# Patient Record
Sex: FEMALE | Race: WHITE | NOT HISPANIC OR LATINO | Employment: UNEMPLOYED | URBAN - METROPOLITAN AREA
[De-identification: names, ages, dates, MRNs, and addresses within clinical notes are randomized per-mention and may not be internally consistent; named-entity substitution may affect disease eponyms.]

---

## 2017-06-02 ENCOUNTER — AMBULATORY - HEALTHEAST (OUTPATIENT)
Dept: MULTI SPECIALTY CLINIC | Facility: CLINIC | Age: 52
End: 2017-06-02

## 2017-06-02 LAB
HPV ABSTRACT: NORMAL
HPV_EXT - HISTORICAL: NORMAL
PAP SMEAR - HIM PATIENT REPORTED: NORMAL
PAP-ABSTRACT: NORMAL

## 2019-03-21 ENCOUNTER — TRANSFERRED RECORDS (OUTPATIENT)
Dept: HEALTH INFORMATION MANAGEMENT | Facility: CLINIC | Age: 54
End: 2019-03-21

## 2019-03-21 LAB
C TRACH DNA SPEC QL PROBE+SIG AMP: NEGATIVE
N GONORRHOEA DNA SPEC QL PROBE+SIG AMP: NEGATIVE
SPECIMEN DESCRIP: NORMAL
SPECIMEN DESCRIPTION: NORMAL

## 2019-04-11 ENCOUNTER — TELEPHONE (OUTPATIENT)
Dept: GASTROENTEROLOGY | Facility: CLINIC | Age: 54
End: 2019-04-11

## 2019-04-11 NOTE — TELEPHONE ENCOUNTER
[facility] Intake form:    Referring/Requesting provider and Health care System: Ludivina Solano NP from [facility] contact - Name, Lilia Lepe Phone [phone] and Fax number: N/A    Requested provider (if specified): any    Has patient been evaluated in clinic or had a procedure [facility] provider in the last 5 years: yes     Indication/Diagnosis for consultation: cyst on pancreas    Is diagnosis on list of approved diagnosis: yes    Has patient been evaluated by another Gastroenterologist? Yes,      Imaging completed:     CT scan     yes   MRI         no      Procedures:     Upper Endoscopy/EGD    no    Endoscopic Ultrasound/EUS no    ERCP      no    Colonoscopy    yes      Are images able/being pushed to our system? yes    Is patient aware of request for clinc consultation and ok to be contacted to schedule? yes    Inform referring clinic of the following and provided fax number to: [phone] - emailed

## 2019-04-12 NOTE — TELEPHONE ENCOUNTER
"Advanced Endoscopy     Referring provider: Ludivina Morales NP from [facility]       Referred to: [facility]     Provider Requested: NA     Referral Received: 4/12/19     Records received: In [facility]     Images received: reqeusted 4/12/19    Evaluation for: pancreatic cyst and abdominal pain     Clinical History (per RN review):     Hx Gerd, chronic constipation which has improved with medication changes and has been following with [facility]. Also worked up for right hepatic lobe lesions seen on CT.  Now has \"excrutiating\" lower abd pain before BM 2-3 x per week, requesting further work up. Work up started with CT showing 1cm cyst. No colonoscopy on record.    GI work up started at [facility]. Pt now with new insurance as of 4/1/19 and cannot follow at  any more, has selected [facility] for further care.       IMAGING    CT Abd/Pelvis- 3/28/19 for Abd Pain- report in [facility]  CONCLUSION:   1.  Large amount of stool in the right hemicolon.    2.  Stable 1 cm cyst at the junction of the body and tail of the pancreas. Consider Gastroenterology consult. See guidelines below.    3.  Stable small cavernous hemangioma and focal nodular hyperplasia in the liver.      MRI 3/6/18 - report in [facility]  CONCLUSION:  1.  Vague hypodensity in the right hepatic lobe inferiorly suggested on   outside  CT represents a benign cavernous hemangioma. Adjacent atypical hemangioma,   or  perhaps focal nodular hyperplasia in the right lobe anterolaterally.    2.  Small cortical cyst right mid kidney.    3.  Above study performed and interpreted in consultation with Dr. Kelby Sainz.    MD review date: 4/16/19  MD Decision for clinic consultation/Orders: Will recommend pre-op clearance, baseline MRI with IV Gadolinium and EUS in Unit J under MAC anesthesia. This is a routine procedure            Referral updates/Patient contacted:     "

## 2019-04-16 ENCOUNTER — TELEPHONE (OUTPATIENT)
Dept: GASTROENTEROLOGY | Facility: CLINIC | Age: 54
End: 2019-04-16

## 2019-04-16 ENCOUNTER — CARE COORDINATION (OUTPATIENT)
Dept: GASTROENTEROLOGY | Facility: CLINIC | Age: 54
End: 2019-04-16

## 2019-04-16 DIAGNOSIS — K86.89 PANCREATIC MASS: Primary | ICD-10-CM

## 2019-04-16 NOTE — PROGRESS NOTES
Advised of results of referral review.      Patient called and is amenable to plan: EUS with Dr. Holm and aware of the following:  Procedure explained to patient.  This is a non-urgent procedure.     Ordered MRI, gave patient phone number to schedule.     Can expect a call for date and time for procedure.   Will need a , someone to stay with them for 24 hours and stay in town for 24 hours (within 45 min of Hospital) post procedure, rational explained.   Will get pre-op physical done locally and will fax a copy to us along with bringing a hard copy with them. Fax number given. [phone]    Blood thinner -  no  ASA - no  Diabetic - no  NSAIDS: no    A pre-op nurse will call 1-2 days prior to the procedure.   Is advised to be NPO/solid food at midnight before the procedure in the event the procedure time is moved up. Ok to drink clear liquids (Water, Apple Juice or Gatorade) up to 2 hours prior to procedure.     Post Procedure: start with clear liquids and then advance as tolerated.     Verbalized understanding of all instructions. All questions answered.     Pallavi Walker, RN Care Coordinator

## 2019-04-17 ENCOUNTER — HOSPITAL ENCOUNTER (OUTPATIENT)
Facility: CLINIC | Age: 54
End: 2019-04-17
Attending: INTERNAL MEDICINE | Admitting: INTERNAL MEDICINE
Payer: COMMERCIAL

## 2019-04-18 ENCOUNTER — OFFICE VISIT - HEALTHEAST (OUTPATIENT)
Dept: FAMILY MEDICINE | Facility: CLINIC | Age: 54
End: 2019-04-18

## 2019-04-18 DIAGNOSIS — Z01.818 ENCOUNTER FOR PREOPERATIVE EXAMINATION FOR GENERAL SURGICAL PROCEDURE: ICD-10-CM

## 2019-04-18 DIAGNOSIS — K59.09 CHRONIC CONSTIPATION: ICD-10-CM

## 2019-04-18 DIAGNOSIS — F32.1 CURRENT MODERATE EPISODE OF MAJOR DEPRESSIVE DISORDER WITHOUT PRIOR EPISODE (H): ICD-10-CM

## 2019-04-18 DIAGNOSIS — D75.838 REACTIVE THROMBOCYTOSIS: ICD-10-CM

## 2019-04-18 DIAGNOSIS — K86.2 PANCREAS CYST: ICD-10-CM

## 2019-04-18 DIAGNOSIS — Z78.0 POST-MENOPAUSAL: ICD-10-CM

## 2019-04-18 LAB
ANION GAP SERPL CALCULATED.3IONS-SCNC: 13 MMOL/L (ref 5–18)
BUN SERPL-MCNC: 17 MG/DL (ref 8–22)
CALCIUM SERPL-MCNC: 10.3 MG/DL (ref 8.5–10.5)
CHLORIDE BLD-SCNC: 102 MMOL/L (ref 98–107)
CO2 SERPL-SCNC: 25 MMOL/L (ref 22–31)
CREAT SERPL-MCNC: 0.76 MG/DL (ref 0.6–1.1)
ERYTHROCYTE [DISTWIDTH] IN BLOOD BY AUTOMATED COUNT: 11.7 % (ref 11–14.5)
GFR SERPL CREATININE-BSD FRML MDRD: >60 ML/MIN/1.73M2
GLUCOSE BLD-MCNC: 80 MG/DL (ref 70–125)
HCT VFR BLD AUTO: 42.6 % (ref 35–47)
HGB BLD-MCNC: 14 G/DL (ref 12–16)
MCH RBC QN AUTO: 29.3 PG (ref 27–34)
MCHC RBC AUTO-ENTMCNC: 32.9 G/DL (ref 32–36)
MCV RBC AUTO: 89 FL (ref 80–100)
PLATELET # BLD AUTO: 475 THOU/UL (ref 140–440)
PMV BLD AUTO: 7.4 FL (ref 7–10)
POTASSIUM BLD-SCNC: 5.6 MMOL/L (ref 3.5–5)
RBC # BLD AUTO: 4.77 MILL/UL (ref 3.8–5.4)
SODIUM SERPL-SCNC: 140 MMOL/L (ref 136–145)
WBC: 9.8 THOU/UL (ref 4–11)

## 2019-04-18 ASSESSMENT — MIFFLIN-ST. JEOR: SCORE: 1094.84

## 2019-04-19 ENCOUNTER — COMMUNICATION - HEALTHEAST (OUTPATIENT)
Dept: FAMILY MEDICINE | Facility: CLINIC | Age: 54
End: 2019-04-19

## 2019-04-19 LAB
BASOPHILS # BLD AUTO: 0.2 THOU/UL (ref 0–0.2)
BASOPHILS NFR BLD AUTO: 2 % (ref 0–2)
EOSINOPHIL COUNT (ABSOLUTE): 0.4 THOU/UL (ref 0–0.4)
EOSINOPHIL NFR BLD AUTO: 4 % (ref 0–6)
ERYTHROCYTE [DISTWIDTH] IN BLOOD BY AUTOMATED COUNT: 12.2 % (ref 11–14.5)
HCT VFR BLD AUTO: 43.3 % (ref 35–47)
HGB BLD-MCNC: 14 G/DL (ref 12–16)
IRON SATN MFR SERPL: 15 % (ref 20–50)
IRON SERPL-MCNC: 57 UG/DL (ref 42–175)
LYMPHOCYTES # BLD AUTO: 3.1 THOU/UL (ref 0.8–4.4)
LYMPHOCYTES NFR BLD AUTO: 30 % (ref 20–40)
MCH RBC QN AUTO: 28.9 PG (ref 27–34)
MCHC RBC AUTO-ENTMCNC: 32.3 G/DL (ref 32–36)
MCV RBC AUTO: 90 FL (ref 80–100)
MONOCYTES # BLD AUTO: 0.7 THOU/UL (ref 0–0.9)
MONOCYTES NFR BLD AUTO: 7 % (ref 2–10)
PATH REPORT.MICROSCOPIC SPEC OTHER STN: ABNORMAL
PLAT MORPH BLD: ABNORMAL
PLATELET # BLD AUTO: 457 THOU/UL (ref 140–440)
PMV BLD AUTO: 9.1 FL (ref 8.5–12.5)
RBC # BLD AUTO: 4.84 MILL/UL (ref 3.8–5.4)
REACTIVE LYMPHS: ABNORMAL
TIBC SERPL-MCNC: 387 UG/DL (ref 313–563)
TOTAL NEUTROPHILS-ABS(DIFF): 6 THOU/UL (ref 2–7.7)
TOTAL NEUTROPHILS-REL(DIFF): 58 % (ref 50–70)
TRANSFERRIN SERPL-MCNC: 309 MG/DL (ref 212–360)
WBC: 10.3 THOU/UL (ref 4–11)

## 2019-04-22 ENCOUNTER — COMMUNICATION - HEALTHEAST (OUTPATIENT)
Dept: FAMILY MEDICINE | Facility: CLINIC | Age: 54
End: 2019-04-22

## 2019-04-22 DIAGNOSIS — M25.50 MULTIPLE JOINT PAIN: ICD-10-CM

## 2019-04-22 DIAGNOSIS — R79.89 LOW SERUM PROLACTIN: ICD-10-CM

## 2019-04-22 DIAGNOSIS — R53.82 CHRONIC FATIGUE: ICD-10-CM

## 2019-04-22 DIAGNOSIS — R52 GENERALIZED BODY ACHES: ICD-10-CM

## 2019-04-22 LAB
LAB AP CHARGES (HE HISTORICAL CONVERSION): NORMAL
PATH REPORT.COMMENTS IMP SPEC: NORMAL
PATH REPORT.COMMENTS IMP SPEC: NORMAL
PATH REPORT.FINAL DX SPEC: NORMAL
PATH REPORT.RELEVANT HX SPEC: NORMAL

## 2019-04-23 ENCOUNTER — DOCUMENTATION ONLY (OUTPATIENT)
Dept: CARE COORDINATION | Facility: CLINIC | Age: 54
End: 2019-04-23

## 2019-04-24 ENCOUNTER — COMMUNICATION - HEALTHEAST (OUTPATIENT)
Dept: FAMILY MEDICINE | Facility: CLINIC | Age: 54
End: 2019-04-24

## 2019-04-25 ENCOUNTER — COMMUNICATION - HEALTHEAST (OUTPATIENT)
Dept: FAMILY MEDICINE | Facility: CLINIC | Age: 54
End: 2019-04-25

## 2019-04-30 ENCOUNTER — AMBULATORY - HEALTHEAST (OUTPATIENT)
Dept: LAB | Facility: CLINIC | Age: 54
End: 2019-04-30

## 2019-04-30 DIAGNOSIS — D75.838 REACTIVE THROMBOCYTOSIS: ICD-10-CM

## 2019-04-30 LAB
BASOPHILS # BLD AUTO: 0.1 THOU/UL (ref 0–0.2)
BASOPHILS NFR BLD AUTO: 1 % (ref 0–2)
EOSINOPHIL # BLD AUTO: 0.2 THOU/UL (ref 0–0.4)
EOSINOPHIL NFR BLD AUTO: 3 % (ref 0–6)
ERYTHROCYTE [DISTWIDTH] IN BLOOD BY AUTOMATED COUNT: 12 % (ref 11–14.5)
FERRITIN SERPL-MCNC: 25 NG/ML (ref 10–130)
HCT VFR BLD AUTO: 40.6 % (ref 35–47)
HGB BLD-MCNC: 13.5 G/DL (ref 12–16)
LYMPHOCYTES # BLD AUTO: 2.8 THOU/UL (ref 0.8–4.4)
LYMPHOCYTES NFR BLD AUTO: 41 % (ref 20–40)
MCH RBC QN AUTO: 29.1 PG (ref 27–34)
MCHC RBC AUTO-ENTMCNC: 33.3 G/DL (ref 32–36)
MCV RBC AUTO: 87 FL (ref 80–100)
MONOCYTES # BLD AUTO: 0.5 THOU/UL (ref 0–0.9)
MONOCYTES NFR BLD AUTO: 7 % (ref 2–10)
NEUTROPHILS # BLD AUTO: 3.3 THOU/UL (ref 2–7.7)
NEUTROPHILS NFR BLD AUTO: 49 % (ref 50–70)
PLATELET # BLD AUTO: 354 THOU/UL (ref 140–440)
PMV BLD AUTO: 7.3 FL (ref 7–10)
POTASSIUM BLD-SCNC: 4.6 MMOL/L (ref 3.5–5)
RBC # BLD AUTO: 4.65 MILL/UL (ref 3.8–5.4)
WBC: 6.9 THOU/UL (ref 4–11)

## 2019-05-07 ENCOUNTER — PATIENT OUTREACH (OUTPATIENT)
Dept: GASTROENTEROLOGY | Facility: CLINIC | Age: 54
End: 2019-05-07

## 2019-05-07 ENCOUNTER — HOSPITAL ENCOUNTER (OUTPATIENT)
Facility: CLINIC | Age: 54
End: 2019-05-07
Attending: INTERNAL MEDICINE | Admitting: INTERNAL MEDICINE
Payer: COMMERCIAL

## 2019-05-07 ENCOUNTER — TELEPHONE (OUTPATIENT)
Dept: GASTROENTEROLOGY | Facility: CLINIC | Age: 54
End: 2019-05-07

## 2019-05-07 NOTE — PROGRESS NOTES
Hien called with worries and concerns regarding her EUS scheduled on 6/25. She's still having abdominal pain that can't be explained, is worried that she might have ovarian cancer and about the cysts listed on her kidney and liver. She's also worried that she might be without insurance after 5/31 due to a job change.    Reviewed findings from chart with her, particularly her last CT scan. Offered to move up EUS if possible and reached out to scheduling, however, her insurance requires a clinic visit before procedure and unable to get clinic prior to when it is  Scheduled on 6/13. Called patient back and she said she was going to GYN tomorrow and confirmed that we had copies of her recent bloodwork, which was confirmed via [facility]. Confirmed she wanted to keep clinic and EUS as scheduled, patient in agreement.    Pallavi Walker, EMMANUEL Care Coordinator

## 2019-05-07 NOTE — TELEPHONE ENCOUNTER
Patient returned phone call regarding rescheduling EUS.  Patient states she had a conversation with EMMANUEL Olivo letting her know that insurance would not cover EUS until she is seen in clinic.  Patient states she can't get into clinic until June.  Patient states she would like to cancel EUS at this time until she can be seen in the clinic.  Confirmed with patient.  Message sent to Dr. Cardona and EMMANUEL Olivo informing them of this.

## 2019-05-08 ENCOUNTER — OFFICE VISIT (OUTPATIENT)
Dept: OBGYN | Facility: CLINIC | Age: 54
End: 2019-05-08
Payer: COMMERCIAL

## 2019-05-08 VITALS
BODY MASS INDEX: 21.44 KG/M2 | HEART RATE: 79 BPM | WEIGHT: 121.6 LBS | OXYGEN SATURATION: 98 % | SYSTOLIC BLOOD PRESSURE: 125 MMHG | DIASTOLIC BLOOD PRESSURE: 81 MMHG

## 2019-05-08 DIAGNOSIS — R10.31 RLQ ABDOMINAL PAIN: Primary | ICD-10-CM

## 2019-05-08 DIAGNOSIS — K59.09 CHRONIC CONSTIPATION: ICD-10-CM

## 2019-05-08 PROCEDURE — 99203 OFFICE O/P NEW LOW 30 MIN: CPT | Performed by: OBSTETRICS & GYNECOLOGY

## 2019-05-08 RX ORDER — POLYETHYLENE GLYCOL 3350 17 G/17G
1 POWDER, FOR SOLUTION ORAL DAILY
COMMUNITY

## 2019-05-08 RX ORDER — TRAZODONE HYDROCHLORIDE 50 MG/1
75 TABLET, FILM COATED ORAL AT BEDTIME
COMMUNITY

## 2019-05-08 RX ORDER — GABAPENTIN 100 MG/1
CAPSULE ORAL
Refills: 2 | COMMUNITY
Start: 2019-04-25

## 2019-05-08 RX ORDER — TRETINOIN 0.25 MG/G
CREAM TOPICAL AT BEDTIME
COMMUNITY

## 2019-05-10 NOTE — PROGRESS NOTES
"Hien is a 54 year old female .  She presents today to see me for RLQ pain.  She states she has had this \"for a while.\"  Later she acknowledges she has had it for years.  She isn't able to describe the pain to me.  She went through Menopause at age 50.  She has a history of constipation.  She has used Miralax, drinking lots of water.  She has also seen PT due to her chronic joint pain.  She notes that she had a low prolactin level on testing that was performed when she saw the Sexual Health MD at [facility].   She does not know of any other aggravating or alleviating factors.    She had a CT scan performed at [facility] and the results are reviewed.   CT Abd Pelvis W IV Cont3/  Counts include 234 beds at the [facility]  Result Narrative   EXAM: CT ABD PELVIS W IV CONT  LOCATION: [facility]  DATE/TIME: 3/28/2019 2:48 PM    INDICATION: Generalized abdominal pain.  COMPARISON: MRI abdomen 3/5/2018 and noncontrast CT abdomen and pelvis 2018.  TECHNIQUE: Helical enhanced thin-section CT scan of the abdomen and pelvis was performed following injection of IV contrast. Multiplanar reformats were obtained. Dose reduction techniques were used.   CONTRAST: Iohexol 350 mg/mL IV SOLN 100 mL.    FINDINGS:   LUNG BASES: Negative.    ABDOMEN: Stable 1.5 cm diffusely enhancing mass in segment 4B of the liver, series 2 image 27, corresponding to focal nodular hyperplasia on prior MRI. Stable 1.5 cm cavernous hemangioma in segment 5-6 of the liver inferiorly, series 2 image 40. Gallbladder, spleen, both adrenal glands, left kidney, and stomach are normal. Stable 1.0 x 0.5 cm cyst at the junction of the body and tail of the pancreas, series 2 image 19. The pancreas is otherwise unremarkable. Stable small right renal cyst. No lymphadenopathy.    PELVIS: Normal caliber abdominal aorta and IVC. Both ureters, bladder, uterus, both adnexa, and the appendix are normal. Large amount of stool in the right hemicolon. The bowel is " otherwise unremarkable. No free fluid or lymphadenopathy.    MUSCULOSKELETAL: Negative.    CONCLUSION:   1.  Large amount of stool in the right hemicolon.    2.  Stable 1 cm cyst at the junction of the body and tail of the pancreas. Consider Gastroenterology consult. See guidelines below.    3.  Stable small cavernous hemangioma and focal nodular hyperplasia in the liver.    REFERENCE:  International Consensus Guidelines for Management of IPMN and MCN of the Pancreas. Pancreatology 12 (2012) 183-197.    Asymptomatic patient without high-risk stigmata of malignancy (obstructive jaundice with cystic lesion in head of pancreas, enhancing solid component within cyst, or main pancreatic duct greater than 10 mm), and without worrisome features (cyst greater than 3 cm, thickened/enhancing cyst walls, main pancreatic duct 5-9 mm, mural nodule, or abrupt change in caliber of pancreatic duct with distal pancreatic atrophy).    Size of largest cyst:  Less than 1 cm: CT or MRI with contrast in 2-3 years.    1-2 cm: CT or MRI with contrast yearly for 2 years, then lengthen interval if no change.    2-3 cm: EUS in 3-6 months, then can alternate MRI with EUS as appropriate.    Greater than 3 cm: Close surveillance alternating MRI with EUS every 3-6 months. Consider surgery in young, fit patients.    Consider Gastroenterology consultation for all categories of pancreatic cysts.   Other Result Information   Interface, In Rad Results - 03/28/2019  4:54 PM CDT  EXAM: CT ABD PELVIS W IV CONT  LOCATION:  [facility]  DATE/TIME: 3/28/2019 2:48 PM    INDICATION: Generalized abdominal pain.  COMPARISON: MRI abdomen 3/5/2018 and noncontrast CT abdomen and pelvis 1/17/2018.  TECHNIQUE: Helical enhanced thin-section CT scan of the abdomen and pelvis was performed following injection of IV contrast. Multiplanar reformats were obtained. Dose reduction techniques were used.   CONTRAST: Iohexol 350 mg/mL IV SOLN 100 mL.    FINDINGS:   LUNG  BASES: Negative.    ABDOMEN: Stable 1.5 cm diffusely enhancing mass in segment 4B of the liver, series 2 image 27, corresponding to focal nodular hyperplasia on prior MRI. Stable 1.5 cm cavernous hemangioma in segment 5-6 of the liver inferiorly, series 2 image 40. Gallbladder, spleen, both adrenal glands, left kidney, and stomach are normal. Stable 1.0 x 0.5 cm cyst at the junction of the body and tail of the pancreas, series 2 image 19. The pancreas is otherwise unremarkable. Stable small right renal cyst. No lymphadenopathy.    PELVIS: Normal caliber abdominal aorta and IVC. Both ureters, bladder, uterus, both adnexa, and the appendix are normal. Large amount of stool in the right hemicolon. The bowel is otherwise unremarkable. No free fluid or lymphadenopathy.    MUSCULOSKELETAL: Negative.    CONCLUSION:   1.  Large amount of stool in the right hemicolon.    2.  Stable 1 cm cyst at the junction of the body and tail of the pancreas. Consider Gastroenterology consult. See guidelines below.    3.  Stable small cavernous hemangioma and focal nodular hyperplasia in the liver.    REFERENCE:  International Consensus Guidelines for Management of IPMN and MCN of the Pancreas. Pancreatology 12 (2012) 183-197.    Asymptomatic patient without high-risk stigmata of malignancy (obstructive jaundice with cystic lesion in head of pancreas, enhancing solid component within cyst, or main pancreatic duct greater than 10 mm), and without worrisome features (cyst greater than 3 cm, thickened/enhancing cyst walls, main pancreatic duct 5-9 mm, mural nodule, or abrupt change in caliber of pancreatic duct with distal pancreatic atrophy).    Size of largest cyst:  Less than 1 cm: CT or MRI with contrast in 2-3 years.    1-2 cm: CT or MRI with contrast yearly for 2 years, then lengthen interval if no change.    2-3 cm: EUS in 3-6 months, then can alternate MRI with EUS as appropriate.    Greater than 3 cm: Close surveillance alternating  MRI with EUS every 3-6 months. Consider surgery in young, fit patients.    Consider Gastroenterology consultation for all categories of pancreatic cysts.           Past Medical History:   Diagnosis Date     Acne      SANDRA positive      Anxiety      Arthralgia of multiple joints      Bilateral knee pain      Chest wall pain      Chronic constipation      Dry eye      Dysthymic disorder      Fibrocystic breast      Fibromyalgia      GERD (gastroesophageal reflux disease)      Hip pain      Hypermobility of joint      Insomnia      Moderate episode of recurrent major depressive disorder (H)      Primary osteoarthritis of both hands      Sensory hearing loss, bilateral      Somatic dysfunction of pelvic region      Ureteral calculus of left kidney transplant        Past Surgical History:   Procedure Laterality Date     LASIK BILATERAL         OB History    Para Term  AB Living   2 0 0 0 0 0   SAB TAB Ectopic Multiple Live Births   0 0 0 0 2      # Outcome Date GA Lbr Lukas/2nd Weight Sex Delivery Anes PTL Lv   2             1                 Gynecological History         No LMP recorded (lmp unknown). Patient is postmenopausal.     no STD/no PID/no IUD   no abnormal pap smear (last pap 17)      see above HPI        Allergies   Allergen Reactions     Lubiprostone Shortness Of Breath, Other (See Comments), Difficulty breathing and Unknown       Current Outpatient Medications   Medication Sig Dispense Refill     Ascorbic Acid (VITAMIN C PO) Take 1,000 mg by mouth daily       calcium citrate-vitamin D (CITRACAL) 315-200 MG-UNIT TABS Take 2 tablets by mouth daily       Calcium-Vitamin D-Vitamin K (CALCIUM SOFT CHEWS PO) Take 1 chew tab by mouth 2 times daily       Coenzyme Q10 (CO Q 10) 10 MG CAPS Take 100 mg by mouth daily       Cyanocobalamin (B-12) 2500 MCG TABS Take 2,500 mg by mouth daily       gabapentin (NEURONTIN) 100 MG capsule TAKE 1 CAPSULE BY MOUTH THREE TIMES A DAY AS  NEEDED FOR ANXIETY  2     GLUCOSAMINE SULFATE PO Take 1,500 mg by mouth daily       MAGNESIUM OXIDE PO Take 3 tablets by mouth daily       Omega-3 Fatty Acids (OMEGA-3 FISH OIL PO) Take 2 tablets by mouth daily       polyethylene glycol (MIRALAX/GLYCOLAX) packet Take 1 packet by mouth daily       Probiotic Product (PROBIOTIC DAILY PO) Take 5,000 mcg by mouth       traZODone (DESYREL) 50 MG tablet Take 75 mg by mouth At Bedtime       tretinoin (RETIN-A) 0.025 % external cream Apply topically At Bedtime       vitamin  B complex with vitamin C (VITAMIN  B COMPLEX) TABS Take 1 tablet by mouth daily       VITAMIN E MTC PO Take 400 Units by mouth       Zinc 50 MG CAPS Take 1 tablet by mouth         Social History     Socioeconomic History     Marital status:      Spouse name: Not on file     Number of children: 2     Years of education: Not on file     Highest education level: Not on file   Occupational History     Not on file   Social Needs     Financial resource strain: Not on file     Food insecurity:     Worry: Not on file     Inability: Not on file     Transportation needs:     Medical: Not on file     Non-medical: Not on file   Tobacco Use     Smoking status: Former Smoker     Last attempt to quit: 1995     Years since quittin.3   Substance and Sexual Activity     Alcohol use: Yes     Drug use: Not Currently     Sexual activity: Not on file   Lifestyle     Physical activity:     Days per week: Not on file     Minutes per session: Not on file     Stress: Not on file   Relationships     Social connections:     Talks on phone: Not on file     Gets together: Not on file     Attends [employer] service: Not on file     Active member of club or organization: Not on file     Attends meetings of clubs or organizations: Not on file     Relationship status: Not on file     Intimate partner violence:     Fear of current or ex partner: Not on file     Emotionally abused: Not on file     Physically abused: Not on  file     Forced sexual activity: Not on file   Other Topics Concern     Not on file   Social History Narrative     Not on file     Family History   Problem Relation Age of Onset     Hyperlipidemia Mother      Thyroid Disease Mother      Breast Cancer Mother      Heart Disease Father      Hyperlipidemia Father      Hypertension Father      GI problems Father      Hyperlipidemia Sister      Polycystic ovary syndrome Sister        Review of Systems:  10 point ROS of systems including Constitutional, Eyes, Respiratory, Cardiovascular, Gastroenterology, Genitourinary, Integumentary, Muscularskeletal, Psychiatric were all negative except for pertinent positives noted in my HPI and in the PMH.        EXAM:  /81 (BP Location: Right arm, Cuff Size: Adult Regular)   Pulse 79   Wt 55.2 kg (121 lb 9.6 oz)   LMP  (LMP Unknown)   SpO2 98%   Breastfeeding? No   BMI 21.44 kg/m    Body mass index is 21.44 kg/m .  General:  WNWD female, NAD  Alert  Oriented x 3  Gait:  Normal  Skin:  Normal skin turgor  HEENT:  NC/AT, EOMI  Neck:  No masses noted, symmetrical  Lungs:  Good respiratory effort   Abdomen:  Mild tenderness to the right of midline and umbilicus, non-distended.  Vulva: No external lesions, normal hair distribution, no adenopathy  BUS:  Normal, no masses noted  Urethra:  No hypermobility noted   Urethral meatus:  No masses or lesions seen.  Vagina: Moist, pink, no abnormal discharge, well rugated, no lesions  Cervix: Smooth, pink, no visible lesions  Uterus: Normal size, anteverted, non-tender, mobile  Ovaries: No mass, non-tender, mobile  Perianal: no masses or lesions seen.   Extremities:  No clubbing, cyanosis or edema.       ASSESSMENT:  RLQ pain  Chronic constipation       PLAN:  The patient and I discussed her extensive medical history, of which none of it was completed previously, so the medical, surgical and family histories are reviewed and completed.   Based on the exam, I don't suspect the pain is GYN  in origin.  I suspect it might be related with her bowel issues and chronic constipation.  She asked about the pancreatic cyst and if that might be related with her symptoms.  I don't suspect the pancreatic cyst is related to GYN issues.    She is to follow up with GI  TT 30 min  CT and review of records greater than 50%  Ifeanyi Linton MD

## 2019-05-13 ENCOUNTER — DOCUMENTATION ONLY (OUTPATIENT)
Dept: CARE COORDINATION | Facility: CLINIC | Age: 54
End: 2019-05-13

## 2019-05-23 ENCOUNTER — OFFICE VISIT (OUTPATIENT)
Dept: INTERNAL MEDICINE | Facility: CLINIC | Age: 54
End: 2019-05-23
Payer: COMMERCIAL

## 2019-05-23 VITALS
BODY MASS INDEX: 21.33 KG/M2 | HEART RATE: 69 BPM | OXYGEN SATURATION: 98 % | SYSTOLIC BLOOD PRESSURE: 121 MMHG | DIASTOLIC BLOOD PRESSURE: 83 MMHG | WEIGHT: 121 LBS

## 2019-05-23 DIAGNOSIS — F32.A DEPRESSION, UNSPECIFIED DEPRESSION TYPE: ICD-10-CM

## 2019-05-23 DIAGNOSIS — R53.83 OTHER FATIGUE: ICD-10-CM

## 2019-05-23 DIAGNOSIS — E78.2 MIXED HYPERLIPIDEMIA: ICD-10-CM

## 2019-05-23 DIAGNOSIS — R93.89 ABNORMAL CT SCAN: Primary | ICD-10-CM

## 2019-05-23 DIAGNOSIS — F41.9 ANXIETY: ICD-10-CM

## 2019-05-23 LAB — DEPRECATED CALCIDIOL+CALCIFEROL SERPL-MC: 65 UG/L (ref 20–75)

## 2019-05-23 RX ORDER — BUSPIRONE HYDROCHLORIDE 7.5 MG/1
7.5 TABLET ORAL 2 TIMES DAILY
Qty: 60 TABLET | Refills: 1 | Status: SHIPPED | OUTPATIENT
Start: 2019-05-23

## 2019-05-23 RX ORDER — CITALOPRAM HYDROBROMIDE 10 MG/1
10 TABLET ORAL DAILY
Qty: 60 TABLET | Refills: 1 | Status: SHIPPED | OUTPATIENT
Start: 2019-05-23

## 2019-05-23 ASSESSMENT — ENCOUNTER SYMPTOMS
DYSPNEA ON EXERTION: 0
HOARSE VOICE: 1
HALLUCINATIONS: 0
ABDOMINAL PAIN: 1
SNORES LOUDLY: 0
NAIL CHANGES: 1
DECREASED CONCENTRATION: 1
ORTHOPNEA: 0
WEAKNESS: 1
EYE PAIN: 1
DIZZINESS: 0
SINUS PAIN: 1
DECREASED LIBIDO: 1
LOSS OF CONSCIOUSNESS: 0
EYE REDNESS: 1
JOINT SWELLING: 0
BACK PAIN: 1
EXERCISE INTOLERANCE: 0
SPEECH CHANGE: 0
NECK PAIN: 0
LIGHT-HEADEDNESS: 0
INSOMNIA: 1
ARTHRALGIAS: 1
TROUBLE SWALLOWING: 1
COUGH DISTURBING SLEEP: 1
EYE WATERING: 1
CONSTIPATION: 1
TREMORS: 1
NECK MASS: 0
CHILLS: 1
BOWEL INCONTINENCE: 0
DECREASED APPETITE: 0
BLOATING: 1
HYPOTENSION: 0
HEARTBURN: 1
TASTE DISTURBANCE: 1
SINUS CONGESTION: 1
JAUNDICE: 0
SKIN CHANGES: 0
SEIZURES: 0
SYNCOPE: 0
DOUBLE VISION: 0
NIGHT SWEATS: 0
SORE THROAT: 1
LEG PAIN: 0
FEVER: 1
WEIGHT GAIN: 0
SPUTUM PRODUCTION: 1
HEMOPTYSIS: 0
MYALGIAS: 1
MUSCLE WEAKNESS: 1
INCREASED ENERGY: 1
HYPERTENSION: 1
SHORTNESS OF BREATH: 0
POLYDIPSIA: 0
SMELL DISTURBANCE: 1
DIARRHEA: 0
FATIGUE: 1
PALPITATIONS: 1
COUGH: 1
NERVOUS/ANXIOUS: 1
MEMORY LOSS: 1
POLYPHAGIA: 0
WEIGHT LOSS: 0
WHEEZING: 0
DEPRESSION: 1
EYE IRRITATION: 1
STIFFNESS: 1
POSTURAL DYSPNEA: 0
SLEEP DISTURBANCES DUE TO BREATHING: 0
PANIC: 0
HEADACHES: 1
NUMBNESS: 0
POOR WOUND HEALING: 0
BLOOD IN STOOL: 0
HOT FLASHES: 0
MUSCLE CRAMPS: 1
PARALYSIS: 0
NAUSEA: 1
ALTERED TEMPERATURE REGULATION: 0
DISTURBANCES IN COORDINATION: 0
VOMITING: 0
RECTAL PAIN: 0
TINGLING: 0

## 2019-05-23 ASSESSMENT — PAIN SCALES - GENERAL: PAINLEVEL: NO PAIN (0)

## 2019-05-23 ASSESSMENT — ANXIETY QUESTIONNAIRES
1. FEELING NERVOUS, ANXIOUS, OR ON EDGE: NEARLY EVERY DAY
GAD7 TOTAL SCORE: 17
5. BEING SO RESTLESS THAT IT IS HARD TO SIT STILL: NOT AT ALL
6. BECOMING EASILY ANNOYED OR IRRITABLE: NEARLY EVERY DAY
2. NOT BEING ABLE TO STOP OR CONTROL WORRYING: NEARLY EVERY DAY
3. WORRYING TOO MUCH ABOUT DIFFERENT THINGS: NEARLY EVERY DAY
IF YOU CHECKED OFF ANY PROBLEMS ON THIS QUESTIONNAIRE, HOW DIFFICULT HAVE THESE PROBLEMS MADE IT FOR YOU TO DO YOUR WORK, TAKE CARE OF THINGS AT HOME, OR GET ALONG WITH OTHER PEOPLE: EXTREMELY DIFFICULT
7. FEELING AFRAID AS IF SOMETHING AWFUL MIGHT HAPPEN: MORE THAN HALF THE DAYS

## 2019-05-23 ASSESSMENT — PATIENT HEALTH QUESTIONNAIRE - PHQ9
5. POOR APPETITE OR OVEREATING: NEARLY EVERY DAY
SUM OF ALL RESPONSES TO PHQ QUESTIONS 1-9: 22

## 2019-05-23 NOTE — PATIENT INSTRUCTIONS
[facility] Medication Refill Request Information:  * Please contact your pharmacy regarding ANY request for medication refills.  ** [facility] Prescription Fax = [phone]  * Please allow 3 business days for routine medication refills.  * Please allow 5 business days for controlled substance medication refills.     [facility] Test Result notification information:  *You will be notified with in 7-10 days of your appointment day regarding the results of your test.  If you are on MyChart you will be notified as soon as the provider has reviewed the results and signed off on them.    [facility]: [phone]

## 2019-05-23 NOTE — PROGRESS NOTES
[facility]  Primary Care Center   Ct Potts MD  05/23/2019      Chief Complaint:   Establish Care       History of Present Illness:   Hien Palafox is a 54 year old female who presents to establish care. The patient recently switched insurance companies so she had to switch primary care providers as well from [facility]. She is a nurse but it currently between jobs. We updated her medical history, surgical history, family history and social history.     Pancreatic cyst: When she was being seen at [facility] they found a pancreatic cyst on a CT. However, she was switching insurances so could not follow up with the clinic she was seeing before. She was referred to Dr. Holm by [facility] and they set up a EGD without a consult first. She had a preop done but had to reschedule the EGD so then had to redo the preop. The most recent preop she was ill with a URI and wanted to discuss those labs as well. She does not know if she wants to follow through with the EGD because it is very expensive and she is not super worried about the cysts. She has no abdominal pain.     URI: The patient has had symptoms of the URI in late March. She got over them but they are now back. She has a reproductive cough, sinus pain, ear ache and possible fevers. She says she has never been this sick before. She does not think it is allergies but is ultimately unsure. She felt well for three weeks after the first URI but then started feeling all the symptoms she felt before except for a sore throat. She feels the phlegm from the cough is coming from the lungs. She denies any vomiting or diarrhea. She has never had any lab abnormalities like this before, elevated platelets and potassium.     Psychiatry: She has had on and off depression for 30+ years now. She is not on any medication regularly right now but has a Gabapentin prescription saying it does not work for her. She has taken a lot of different  antidepressants over the years but did not remember which one worked for her. She also finds it hard to sleep and will be up late some nights. She has not had a consistent therapist in a long time because she has not found one she is comfortable with or one that is in her network. Last year she saw one therapist who she felt was judging her and shaming her. Her daughter also has mental health issues and she tried some medications that she is on but none worked for her.     Family issues: Since she is between jobs she is not working currently. Her daughter just graduated from college but does not have a job so is also not working. On top of that her son is about to graduate high school and is going to the [employer] but did not apply for scholarships so will have to pay for tuition in full. Nobody has a job right now so she is worried about money.     Abdominal pain: The patient has on and off LRQ abdominal pain. She has seen a physical therapist in the past and they attributed her pain to hyprerflexibility. Her muscles will get very tight and she needs to roll out after working out or else she has pain.     Libido: In the past, she has had a lot of problems with libido. Sex is painful for her and she is very dry. She tried testosterone pills in the past but stopped doing that because she does not have a partner. She also has tried vaginal estrogen cream and serotonin specific reuptake inhibitor. She stopped all of this in January. The patient would like something to help her with her mental health symptoms and libido if possible.     Cholesterol: She had labs taken about a year ago at her old clinic. Her LDL and total cholesterol were a little bit high and because of her mother's cholesterol issues she would like to get her cholesterol checked today and regularly in the future.     Other concerns discussed:  1. Nails keep peeling, tries to eat a healthy diet, takes Calcium supplement   2. Wanted to  discuss high blood pressure in the past but it was normal today        Review of Systems:   Pertinent items are noted in HPI and below, remainder of complete ROS is negative.    Answers for HPI/ROS submitted by the patient on 5/23/2019   General Symptoms: Yes  Skin Symptoms: Yes  HENT Symptoms: Yes  EYE SYMPTOMS: Yes  HEART SYMPTOMS: Yes  LUNG SYMPTOMS: Yes  INTESTINAL SYMPTOMS: Yes  URINARY SYMPTOMS: No  GYNECOLOGIC SYMPTOMS: Yes  BREAST SYMPTOMS: No  SKELETAL SYMPTOMS: Yes  BLOOD SYMPTOMS: No  NERVOUS SYSTEM SYMPTOMS: Yes  MENTAL HEALTH SYMPTOMS: Yes  Fever: Yes  Loss of appetite: No  Weight loss: No  Weight gain: No  Fatigue: Yes  Night sweats: No  Chills: Yes  Increased stress: Yes  Excessive hunger: No  Excessive thirst: No  Feeling hot or cold when others believe the temperature is normal: No  Loss of height: No  Post-operative complications: No  Surgical site pain: No  Hallucinations: No  Change in or Loss of Energy: Yes  Hyperactivity: No  Confusion: Yes  Changes in hair: Yes  Changes in moles/birth marks: No  Itching: No  Rashes: No  Changes in nails: Yes  Acne: Yes  Hair in places you don't want it: No  Change in facial hair: No  Warts: No  Non-healing sores: No  Scarring: No  Flaking of skin: No  Color changes of hands/feet in cold : No  Sun sensitivity: No  Skin thickening: No  Ear pain: Yes  Ear discharge: No  Hearing loss: No  Tinnitus: No  Nosebleeds: No  Congestion: Yes  Sinus pain: Yes  Trouble swallowing: Yes   Voice hoarseness: Yes  Mouth sores: No  Sore throat: Yes  Tooth pain: No  Gum tenderness: No  Bleeding gums: No  Change in taste: Yes  Change in sense of smell: Yes  Dry mouth: No  Hearing aid used: No  Neck lump: No  Eye pain: Yes  Vision loss: No  Dry eyes: Yes  Watery eyes: Yes  Eye bulging: No  Double vision: No  Flashing of lights: No  Spots: No  Floaters: No  Redness: Yes  Crossed eyes: No  Tunnel Vision: No  Yellowing of eyes: No  Eye irritation: Yes  Cough: Yes  Sputum or phlegm:  Yes  Coughing up blood: No  Difficulty breating or shortness of breath: No  Snoring: No  Wheezing: No  Difficulty breathing on exertion: No  Nighttime Cough: Yes  Difficulty breathing when lying flat: No  Chest pain or pressure: No  Fast or irregular heartbeat: Yes  Pain in legs with walking: No  Trouble breathing while lying down: No  Fingers or toes appear blue: No  High blood pressure: Yes  Low blood pressure: No  Fainting: No  Murmurs: No  Pacemaker: No  Varicose veins: No  Edema or swelling: No  Wake up at night with shortness of breath: No  Light-headedness: No  Exercise intolerance: No  Heart burn or indigestion: Yes  Nausea: Yes  Vomiting: No  Abdominal pain: Yes  Bloating: Yes  Constipation: Yes  Diarrhea: No  Blood in stool: No  Black stools: No  Rectal or Anal pain: No  Fecal incontinence: No  Yellowing of skin or eyes: No  Vomit with blood: No  Change in stools: No  Back pain: Yes  Muscle aches: Yes  Neck pain: No  Swollen joints: No  Joint pain: Yes  Bone pain: Yes  Muscle cramps: Yes  Muscle weakness: Yes  Joint stiffness: Yes  Bone fracture: No  Trouble with coordination: No  Dizziness or trouble with balance: No  Fainting or black-out spells: No  Memory loss: Yes  Headache: Yes  Seizures: No  Speech problems: No  Tingling: No  Tremor: Yes  Weakness: Yes  Difficulty walking: No  Paralysis: No  Numbness: No  Bleeding or spotting between periods: No  Heavy or painful periods: No  Irregular periods: No  Vaginal discharge: No  Hot flashes: No  Vaginal dryness: Yes  Genital ulcers: No  Reduced libido: Yes  Painful intercourse: Yes  Difficulty with sexual arousal: Yes  Post-menopausal bleeding: No  Nervous or Anxious: Yes  Depression: Yes  Trouble sleeping: Yes  Trouble thinking or concentrating: Yes  Mood changes: Yes  Panic attacks: No      Active Medications:     Current Outpatient Medications:      Ascorbic Acid (VITAMIN C PO), Take 1,000 mg by mouth daily, Disp: , Rfl:      busPIRone (BUSPAR) 7.5 MG  tablet, Take 1 tablet (7.5 mg) by mouth 2 times daily May increase to 15 mg twice daily, Disp: 60 tablet, Rfl: 1     calcium citrate-vitamin D (CITRACAL) 315-200 MG-UNIT TABS, Take 2 tablets by mouth daily, Disp: , Rfl:      citalopram (CELEXA) 10 MG tablet, Take 1 tablet (10 mg) by mouth daily, Disp: 60 tablet, Rfl: 1     Coenzyme Q10 (CO Q 10) 10 MG CAPS, Take 100 mg by mouth daily, Disp: , Rfl:      Cyanocobalamin (B-12) 2500 MCG TABS, Take 2,500 mg by mouth daily, Disp: , Rfl:      gabapentin (NEURONTIN) 100 MG capsule, TAKE 1 CAPSULE BY MOUTH THREE TIMES A DAY AS NEEDED FOR ANXIETY, Disp: , Rfl: 2     MAGNESIUM OXIDE PO, Take 3 tablets by mouth daily, Disp: , Rfl:      Omega-3 Fatty Acids (OMEGA-3 FISH OIL PO), Take 2 tablets by mouth daily, Disp: , Rfl:      polyethylene glycol (MIRALAX/GLYCOLAX) packet, Take 1 packet by mouth daily, Disp: , Rfl:      Probiotic Product (PROBIOTIC DAILY PO), Take 5,000 mcg by mouth, Disp: , Rfl:      ranitidine (ZANTAC) 150 MG tablet, Take 150 mg by mouth as needed for heartburn, Disp: , Rfl:      traZODone (DESYREL) 50 MG tablet, Take 75 mg by mouth At Bedtime, Disp: , Rfl:      tretinoin (RETIN-A) 0.025 % external cream, Apply topically At Bedtime, Disp: , Rfl:      VITAMIN E MTC PO, Take 400 Units by mouth, Disp: , Rfl:      Zinc 50 MG CAPS, Take 1 tablet by mouth, Disp: , Rfl:       Allergies:   Lubiprostone      Past Medical History:  Acne  SANDRA positive  Anxiety  Arthralgia of multiple joints  Chronic constipation  Dysthymic disorder  Fibrocystic breast  Fibromyalgia  Gastroesophageal reflux disease  Hypermobility of joint  Insomnia  Moderate episode of recurrent major depressive disorder  Primary osteoarthritis of both hands  Sensory hearing loss, bilateral  Somatic dysfunction of pelvic region   Ureteral calculus of left kidney transplant      Past Surgical History:  Lasik bilateral    Family History:   Hyperlipidemia - mother  Thyroid disease - mother  Breast cancer -  mother  Heart disease - father  Hyperlipidemia - father, sister  Hypertension - father  GI problems - father  Polycystic ovary syndrome - sister     Social History:   Tobacco Use: former smoker, quit 1995  Alcohol Use: yes   PCP: Margarita Gardner      Physical Exam:   /83 (BP Location: Left arm, Patient Position: Sitting, Cuff Size: Adult Regular)   Pulse 69   Wt 54.9 kg (121 lb)   LMP  (LMP Unknown)   SpO2 98%   Breastfeeding? No   BMI 21.33 kg/m     Constitutional: Alert, oriented, pleasant, no acute distress  Head: Normocephalic, atraumatic  Eyes: Extra-ocular movements intact, no scleral icterus  ENT: Oropharynx clear, moist mucus membranes, good dentition  Neck: Supple, no lymphadenopathy  Cardiovascular: Regular rate and rhythm, no murmurs, rubs or gallops, peripheral pulses full/symmetric  Respiratory: Good air movement bilaterally, lungs clear, no wheezes/rales/rhonchi  Musculoskeletal: No edema, normal muscle tone, normal gait  Neurologic: Alert and oriented, cranial nerves 2-12 intact.  Psychiatric: normal mentation, blunted affect and mood      Assessment and Plan:  Abnormal CT scan  Discussed that pancreatic cysts appear low risk. She has a GI appt but I suspect she may not need EUS.    Depression, unspecified depression type  - citalopram (CELEXA) 10 MG tablet  Dispense: 60 tablet; Refill: 1  - BEHAVIORAL / SPIRITUAL HEALTH ([facility] ONLY)    Anxiety  Reports increased anxiety right now d/t both kids at home.  - busPIRone (BUSPAR) 7.5 MG tablet  Dispense: 60 tablet; Refill: 1  - BEHAVIORAL / SPIRITUAL HEALTH ([facility] ONLY)    Mixed hyperlipidemia  - Lipid panel reflex to direct LDL Fasting    Other fatigue  - Vitamin D Deficiency     Routine Health Maintenance  Immunizations (zoster, pneumovax, flu, Tdap, Hep A/B):   There is no immunization history on file for this patient.  Lipids: ordered  Colonoscopy (50-75 yrs):  6/16  Impression:          - Non-bleeding internal  hemorrhoids.                       - The examination was otherwise normal.                       - No specimens collected.  Recommendation:      - Repeat colonoscopy in 5 years for screening                        purposes, with an extended prep.    Mammogram (40-75 yrs): discussed, up to date, repeat annually   Pap (21-65 yrs): 6/17 pap  HIV/HCV if risk factors:  Safety/Lifestyle: discussed  Tob/EtOH: reviewed   Depression: discussed  Advanced Directive: not discussed      Follow-up: Return in about 6 weeks (around 7/4/2019) for Routine Visit.         Scribe Disclosure:  I, Юлия Hagen, am serving as a scribe to document services personally performed by Ct Potts MD at this visit, based upon the provider's statements to me. All documentation has been reviewed by the aforementioned provider prior to being entered into the official medical record.     Portions of this medical record were completed by a scribe. UPON MY REVIEW AND AUTHENTICATION BY ELECTRONIC SIGNATURE, this confirms (a) I performed the applicable clinical services, and (b) the record is accurate.   Ct Potts MD  Internal Medicine    45 min spent face to face, of which >50% time spent on counseling/coordinating care exclusive of any procedure time

## 2019-05-23 NOTE — NURSING NOTE
Chief Complaint   Patient presents with     Establish Care     Patient states that she is here to establish care with dr elías Lara, EMT at 9:57 AM on 5/23/2019.

## 2019-05-23 NOTE — PROGRESS NOTES
Mammo    6/16  Impression:          - Non-bleeding internal hemorrhoids.                       - The examination was otherwise normal.                       - No specimens collected.  Recommendation:      - Repeat colonoscopy in 5 years for screening                        purposes, with an extended prep.    6/17 pap

## 2019-05-23 NOTE — PROGRESS NOTES
[facility]  Primary Care Center   Ct Potts MD  05/23/2019      Chief Complaint:   Establish Care       History of Present Illness:   Hien Palafox is a 54 year old female who presents to establish care. The patient recently switched insurance companies so she had to switch primary care providers as well from [facility]. She is a nurse but it currently between jobs. We updated her medical history, surgical history, family history and social history.     Pancreatic cyst: When she was being seen at [facility] they found a pancreatic cyst on a CT. However, she was switching insurances so could not follow up with the clinic she was seeing before. She was referred to Dr. Holm by [facility] and they set up a EGD without a consult first. She had a preop done but had to reschedule the EGD so then had to redo the preop. The most recent preop she was ill with a URI and wanted to discuss those labs as well. She does not know if she wants to follow through with the EGD because it is very expensive and she is not super worried about the cysts. She has no abdominal pain.     URI: The patient has had symptoms of the URI in late March. She got over them but they are now back. She has a reproductive cough, sinus pain, ear ache and possible fevers. She says she has never been this sick before. She does not think it is allergies but is ultimately unsure. She felt well for three weeks after the first URI but then started feeling all the symptoms she felt before except for a sore throat. She feels the phlegm from the cough is coming from the lungs. She denies any vomiting or diarrhea. She has never had any lab abnormalities like this before, elevated platelets and potassium.     Psychiatry: She has had on and off depression for 30+ years now. She is not on any medication regularly right now but has a Gabapentin prescription saying it does not work for her. She has taken a lot of different  antidepressants over the years but did not remember which one worked for her. She also finds it hard to sleep and will be up late some nights. She has not had a consistent therapist in a long time because she has not found one she is comfortable with or one that is in her network. Last year she saw one therapist who she felt was judging her and shaming her. Her daughter also has mental health issues and she tried some medications that she is on but none worked for her.     Family issues: Since she is between jobs she is not working currently. Her daughter just graduated from college but does not have a job so is also not working. On top of that her son is about to graduate high school and is going to the [employer] but did not apply for scholarships so will have to pay for tuition in full. Nobody has a job right now so she is worried about money.     Abdominal pain: The patient has on and off LRQ abdominal pain. She has seen a physical therapist in the past and they attributed her pain to hyprerflexibility. Her muscles will get very tight and she needs to roll out after working out or else she has pain.     Libido: In the past, she has had a lot of problems with libido. Sex is painful for her and she is very dry. She tried testosterone pills in the past but stopped doing that because she does not have a partner. She also has tried vaginal estrogen cream and serotonin specific reuptake inhibitor. She stopped all of this in January. The patient would like something to help her with her mental health symptoms and libido if possible.     Cholesterol: She had labs taken about a year ago at her old clinic. Her LDL and total cholesterol were a little bit high and because of her mother's cholesterol issues she would like to get her cholesterol checked today and regularly in the future.     Other concerns discussed:  1. Nails keep peeling, tries to eat a healthy diet, takes Calcium supplement   2. Wanted to  discuss high blood pressure in the past but it was normal today        Review of Systems:   Pertinent items are noted in HPI and below, remainder of complete ROS is negative.    Answers for HPI/ROS submitted by the patient on 5/23/2019   General Symptoms: Yes  Skin Symptoms: Yes  HENT Symptoms: Yes  EYE SYMPTOMS: Yes  HEART SYMPTOMS: Yes  LUNG SYMPTOMS: Yes  INTESTINAL SYMPTOMS: Yes  URINARY SYMPTOMS: No  GYNECOLOGIC SYMPTOMS: Yes  BREAST SYMPTOMS: No  SKELETAL SYMPTOMS: Yes  BLOOD SYMPTOMS: No  NERVOUS SYSTEM SYMPTOMS: Yes  MENTAL HEALTH SYMPTOMS: Yes  Fever: Yes  Loss of appetite: No  Weight loss: No  Weight gain: No  Fatigue: Yes  Night sweats: No  Chills: Yes  Increased stress: Yes  Excessive hunger: No  Excessive thirst: No  Feeling hot or cold when others believe the temperature is normal: No  Loss of height: No  Post-operative complications: No  Surgical site pain: No  Hallucinations: No  Change in or Loss of Energy: Yes  Hyperactivity: No  Confusion: Yes  Changes in hair: Yes  Changes in moles/birth marks: No  Itching: No  Rashes: No  Changes in nails: Yes  Acne: Yes  Hair in places you don't want it: No  Change in facial hair: No  Warts: No  Non-healing sores: No  Scarring: No  Flaking of skin: No  Color changes of hands/feet in cold : No  Sun sensitivity: No  Skin thickening: No  Ear pain: Yes  Ear discharge: No  Hearing loss: No  Tinnitus: No  Nosebleeds: No  Congestion: Yes  Sinus pain: Yes  Trouble swallowing: Yes   Voice hoarseness: Yes  Mouth sores: No  Sore throat: Yes  Tooth pain: No  Gum tenderness: No  Bleeding gums: No  Change in taste: Yes  Change in sense of smell: Yes  Dry mouth: No  Hearing aid used: No  Neck lump: No  Eye pain: Yes  Vision loss: No  Dry eyes: Yes  Watery eyes: Yes  Eye bulging: No  Double vision: No  Flashing of lights: No  Spots: No  Floaters: No  Redness: Yes  Crossed eyes: No  Tunnel Vision: No  Yellowing of eyes: No  Eye irritation: Yes  Cough: Yes  Sputum or phlegm:  Yes  Coughing up blood: No  Difficulty breating or shortness of breath: No  Snoring: No  Wheezing: No  Difficulty breathing on exertion: No  Nighttime Cough: Yes  Difficulty breathing when lying flat: No  Chest pain or pressure: No  Fast or irregular heartbeat: Yes  Pain in legs with walking: No  Trouble breathing while lying down: No  Fingers or toes appear blue: No  High blood pressure: Yes  Low blood pressure: No  Fainting: No  Murmurs: No  Pacemaker: No  Varicose veins: No  Edema or swelling: No  Wake up at night with shortness of breath: No  Light-headedness: No  Exercise intolerance: No  Heart burn or indigestion: Yes  Nausea: Yes  Vomiting: No  Abdominal pain: Yes  Bloating: Yes  Constipation: Yes  Diarrhea: No  Blood in stool: No  Black stools: No  Rectal or Anal pain: No  Fecal incontinence: No  Yellowing of skin or eyes: No  Vomit with blood: No  Change in stools: No  Back pain: Yes  Muscle aches: Yes  Neck pain: No  Swollen joints: No  Joint pain: Yes  Bone pain: Yes  Muscle cramps: Yes  Muscle weakness: Yes  Joint stiffness: Yes  Bone fracture: No  Trouble with coordination: No  Dizziness or trouble with balance: No  Fainting or black-out spells: No  Memory loss: Yes  Headache: Yes  Seizures: No  Speech problems: No  Tingling: No  Tremor: Yes  Weakness: Yes  Difficulty walking: No  Paralysis: No  Numbness: No  Bleeding or spotting between periods: No  Heavy or painful periods: No  Irregular periods: No  Vaginal discharge: No  Hot flashes: No  Vaginal dryness: Yes  Genital ulcers: No  Reduced libido: Yes  Painful intercourse: Yes  Difficulty with sexual arousal: Yes  Post-menopausal bleeding: No  Nervous or Anxious: Yes  Depression: Yes  Trouble sleeping: Yes  Trouble thinking or concentrating: Yes  Mood changes: Yes  Panic attacks: No      Active Medications:     Current Outpatient Medications:      Ascorbic Acid (VITAMIN C PO), Take 1,000 mg by mouth daily, Disp: , Rfl:      calcium citrate-vitamin D  (CITRACAL) 315-200 MG-UNIT TABS, Take 2 tablets by mouth daily, Disp: , Rfl:      Coenzyme Q10 (CO Q 10) 10 MG CAPS, Take 100 mg by mouth daily, Disp: , Rfl:      Cyanocobalamin (B-12) 2500 MCG TABS, Take 2,500 mg by mouth daily, Disp: , Rfl:      gabapentin (NEURONTIN) 100 MG capsule, TAKE 1 CAPSULE BY MOUTH THREE TIMES A DAY AS NEEDED FOR ANXIETY, Disp: , Rfl: 2     MAGNESIUM OXIDE PO, Take 3 tablets by mouth daily, Disp: , Rfl:      Omega-3 Fatty Acids (OMEGA-3 FISH OIL PO), Take 2 tablets by mouth daily, Disp: , Rfl:      polyethylene glycol (MIRALAX/GLYCOLAX) packet, Take 1 packet by mouth daily, Disp: , Rfl:      Probiotic Product (PROBIOTIC DAILY PO), Take 5,000 mcg by mouth, Disp: , Rfl:      ranitidine (ZANTAC) 150 MG tablet, Take 150 mg by mouth as needed for heartburn, Disp: , Rfl:      traZODone (DESYREL) 50 MG tablet, Take 75 mg by mouth At Bedtime, Disp: , Rfl:      tretinoin (RETIN-A) 0.025 % external cream, Apply topically At Bedtime, Disp: , Rfl:      VITAMIN E MTC PO, Take 400 Units by mouth, Disp: , Rfl:      Zinc 50 MG CAPS, Take 1 tablet by mouth, Disp: , Rfl:       Allergies:   Lubiprostone      Past Medical History:  Acne  SANDRA positive  Anxiety  Arthralgia of multiple joints  Chronic constipation  Dysthymic disorder  Fibrocystic breast  Fibromyalgia  Gastroesophageal reflux disease  Hypermobility of joint  Insomnia  Moderate episode of recurrent major depressive disorder  Primary osteoarthritis of both hands  Sensory hearing loss, bilateral  Somatic dysfunction of pelvic region   Ureteral calculus of left kidney transplant      Past Surgical History:  Lasik bilateral    Family History:   Hyperlipidemia - mother  Thyroid disease - mother  Breast cancer - mother  Heart disease - father  Hyperlipidemia - father, sister  Hypertension - father  GI problems - father  Polycystic ovary syndrome - sister     Social History:   Tobacco Use: former smoker, quit 1995  Alcohol Use: yes   PCP: Margarita WESLEY  [place]      Physical Exam:   /83 (BP Location: Left arm, Patient Position: Sitting, Cuff Size: Adult Regular)   Pulse 69   Wt 54.9 kg (121 lb)   LMP  (LMP Unknown)   SpO2 98%   Breastfeeding? No   BMI 21.33 kg/m     Constitutional: Alert, oriented, pleasant, no acute distress  Head: Normocephalic, atraumatic  Eyes: Extra-ocular movements intact, no scleral icterus  ENT: Oropharynx clear, moist mucus membranes, good dentition  Neck: Supple, no lymphadenopathy, no thyromegaly   Cardiovascular: Regular rate and rhythm, no murmurs, rubs or gallops, peripheral pulses full/symmetric  Respiratory: Good air movement bilaterally, lungs clear, no wheezes/rales/rhonchi  GI: Abdomen soft, bowel sounds present, nondistended, nontender, no organomegaly or masses, no rebound/guarding  Musculoskeletal: No edema, normal muscle tone, normal gait  Neurologic: Alert and oriented, cranial nerves 2-12 intact.  Skin: No rashes/lesions  Psychiatric: normal mentation, affect and mood      Assessment and Plan:  Abnormal CT scan  ***    Depression, unspecified depression type  ***    Anxiety  ***     Routine Health Maintenance  Immunizations (zoster, pneumovax, flu, Tdap, Hep A/B):   There is no immunization history on file for this patient.  Lipids: No results for input(s): CHOL, HDL, LDL, TRIG, CHOLHDLRATIO in the last 20417 hours.  PSA (50-75 yrs): No results found for: PSA   AAA Screening (65-75 yrs):  Lung Ca Screening (>30 pk age 55-79 or >20 py age 50-79 + RF):  Colonoscopy (50-75 yrs):  Dexa (>65W or 70M yrs):  Mammogram (40-75 yrs): discussed, up to date, repeat annually   Pap (21-65 yrs): discussed, up to date   Pelvic/Breast:  GC/Chlam (<25 yrs):  HIV/HCV if risk factors:  Safety/Lifestyle: discussed  Tob/EtOH:  Depression:  Advanced Directive:      Follow-up: Return in about 6 weeks (around 7/4/2019) for Routine Visit.         Scribe Disclosure:  Юлия NG, am serving as a scribe to document services personally  performed by Ct Potts MD at this visit, based upon the provider's statements to me. All documentation has been reviewed by the aforementioned provider prior to being entered into the official medical record.     Portions of this medical record were completed by a scribe. UPON MY REVIEW AND AUTHENTICATION BY ELECTRONIC SIGNATURE, this confirms (a) I performed the applicable clinical services, and (b) the record is accurate.

## 2019-05-24 ASSESSMENT — ANXIETY QUESTIONNAIRES: GAD7 TOTAL SCORE: 17

## 2019-05-28 ENCOUNTER — MYC MEDICAL ADVICE (OUTPATIENT)
Dept: INTERNAL MEDICINE | Facility: CLINIC | Age: 54
End: 2019-05-28

## 2019-05-28 DIAGNOSIS — J40 BRONCHITIS: Primary | ICD-10-CM

## 2019-05-28 NOTE — TELEPHONE ENCOUNTER
[facility] Call Center    Phone Message    May a detailed message be left on voicemail: yes, Pt is wanting a call back in regards to medication and also her lab results regarding her Lipids. Pt states she had seen only her Vitamin D test has came back and not the Lipid.     Reason for Call: Medication Question or concern regarding medication   Prescription Clarification  Name of Medication: Anti-biotics  Prescribing Provider: Ct Potts   Pharmacy: [facility] [id number] [place] [address]   What on the order needs clarification? Pt states she is needing anti-biotics due to she is still currently sick. Pt states Doctor had stated she will prescribe medication if Pt is still sick this week. Pt states she is still sick and is wanting the anti-biotics.           Action Taken: Message routed to:  [facility] jazlyn

## 2019-05-29 NOTE — TELEPHONE ENCOUNTER
[facility]    Phone Message    May a detailed message be left on voicemail: yes    Reason for Call: Other: Per call from PT no one has gotten back to her about the antibiotic RX. PT is requesting for a call back if Rx can't get an approval.      Action Taken: Message routed to:  [facility] ([facility]): Primary Care

## 2019-05-30 RX ORDER — AZITHROMYCIN 250 MG/1
250 TABLET, FILM COATED ORAL DAILY
Qty: 6 TABLET | Refills: 0 | Status: SHIPPED | OUTPATIENT
Start: 2019-05-30

## 2019-05-30 NOTE — TELEPHONE ENCOUNTER
Please call patient to let her know azithro sent to her pharmacy. If not feeling better next week would recommend reevaluation.  Thanks,  Ct Potts MD  Internal Medicine

## 2019-05-31 ENCOUNTER — MYC MEDICAL ADVICE (OUTPATIENT)
Dept: BEHAVIORAL HEALTH | Facility: CLINIC | Age: 54
End: 2019-05-31

## 2019-06-11 NOTE — TELEPHONE ENCOUNTER
I spoke with client about Dr. Potts referral to see Bryan Dennis or Dr. Juanito Naranjo for treatment for anxiety and depression. She stated she does not like coming to the [facility]. Writer gave client the number to [facility] and also instructed her how to find providers in her network either by calling them or visiting their web site.

## 2019-11-03 ENCOUNTER — HEALTH MAINTENANCE LETTER (OUTPATIENT)
Age: 54
End: 2019-11-03

## 2020-02-27 ENCOUNTER — PRE VISIT (OUTPATIENT)
Dept: NEUROLOGY | Facility: CLINIC | Age: 55
End: 2020-02-27

## 2020-02-27 NOTE — TELEPHONE ENCOUNTER
FUTURE VISIT INFORMATION      FUTURE VISIT INFORMATION:    Date: 3/17/2020    Time: 8AM     Location: [facility]  REFERRAL INFORMATION:    Referring provider:  Self     Referring providers clinic:      Reason for visit/diagnosis  Numbness, tingling, twitching in legs     RECORDS REQUESTED FROM:       Clinic name Comments Records Status Imaging Status   [facility] 2/26/2020 Care Everywhere N/A

## 2020-03-13 ENCOUNTER — TELEPHONE (OUTPATIENT)
Dept: NEUROLOGY | Facility: CLINIC | Age: 55
End: 2020-03-13

## 2020-03-13 NOTE — TELEPHONE ENCOUNTER
VM left for the patient notifying her that the upcoming appointment scheduled for 3/17/20 will be cancelled.    Per [facility] guidelines, some of the appointment will be cancelled due to the COVID19. Phone number left for the patient to call back and reschedule at her convenience.

## 2020-11-16 ENCOUNTER — HEALTH MAINTENANCE LETTER (OUTPATIENT)
Age: 55
End: 2020-11-16

## 2021-02-07 ENCOUNTER — HEALTH MAINTENANCE LETTER (OUTPATIENT)
Age: 56
End: 2021-02-07

## 2021-05-29 ENCOUNTER — HEALTH MAINTENANCE LETTER (OUTPATIENT)
Age: 56
End: 2021-05-29

## 2021-07-20 VITALS — WEIGHT: 117 LBS | HEIGHT: 63 IN | BODY MASS INDEX: 20.73 KG/M2

## 2021-07-20 NOTE — ADDENDUM NOTE
Addendum Note by Ignacio Yi RT (R) at 4/18/2019 11:00 AM     Author: Ignacio Yi RT (R) Service: -- Author Type: Radiologic Technologist    Filed: 4/19/2019  9:01 AM Encounter Date: 4/18/2019 Status: Signed    : Ignacio Yi RT (R) (Radiologic Technologist)    Addended by: IGNACIO YI on: 4/19/2019 09:01 AM        Modules accepted: Orders

## 2021-07-20 NOTE — ADDENDUM NOTE
Addendum Note by Martha Gutierrez CNP at 4/18/2019 11:00 AM     Author: Martha Gutierrez CNP Service: -- Author Type: Nurse Practitioner    Filed: 4/19/2019  2:39 PM Encounter Date: 4/18/2019 Status: Signed    : Martha Gutierrez CNP (Nurse Practitioner)    Addended by: MARTHA GUTIERREZ on: 4/19/2019 02:39 PM        Modules accepted: Orders

## 2021-07-20 NOTE — TELEPHONE ENCOUNTER
Called patient regarding recent blood work. VM left to call back or check MyChart. She has elevated plts as well as K+. Unclear etiology of either of these. Further blood work does not reveal iron def. I would like patient to return for blood work in 2 weeks to repeat. Orders are placed.

## 2021-07-20 NOTE — ADDENDUM NOTE
Addendum Note by Ignacio Yi RT (R) at 4/18/2019 11:00 AM     Author: Ignacio Yi RT (R) Service: -- Author Type: Radiologic Technologist    Filed: 4/19/2019  8:39 AM Encounter Date: 4/18/2019 Status: Signed    : Ignacio Yi RT (R) (Radiologic Technologist)    Addended by: IGNACIO YI on: 4/19/2019 08:39 AM        Modules accepted: Orders

## 2021-07-20 NOTE — PROGRESS NOTES
Preoperative Exam    Scheduled Procedure: Endoscopy  Surgery Date:  [id number]  Surgery Location: [facility] fax [phone]    Surgeon:  Dr. Jorge    Assessment/Plan:     1. Encounter for preoperative examination for general surgical procedure  Patient seen today for preoperative examination for an endoscopy secondary to pancreas cyst that is warranting further evaluation.  She has a significant history of joint pain, depression, arthritis, and chronic constipation.  She is postmenopausal.  Based on my evaluation today there is no clinical indication to decline patient from having surgery/procedure to be completed.  EKG was not completed today as a procedure is low risk and patient does not have a cardiac history.  Blood work shows thrombocytosis. Pt does currently have a URI and this could explain the result. No history of anemia, blood loss, or post splenectomy.  Recommend proceeding with procedure without further clinical clarification. I will have patient follow up with repeat blood work for further evaluation of the abnormalities.  - Basic Metabolic Panel  - HM2(CBC w/o Differential)    2. Pancreas cyst  Pancrease cyst found on CT scan further evaluation warranted.    3. Chronic constipation  History of chronic constipation has done extensive home remedies/treatment for this.  Has had a colonoscopy.  Recommend further evaluation and management through gastroenterology.    4. Current moderate episode of major depressive disorder without prior episode (H)  History of depression and anxiety.  Placed referral to psychiatry and psychology for patient.  Recommend restarting on medication.  PHQ-9 score today is 16.  She has been on antidepressants in the past.  - Ambulatory referral to Psychiatry  - Ambulatory referral to Psychology    5. Post-menopausal  Postmenopausal state.  She is symptomatic with this and has been taking Vagifem vaginal suppository.  She has had blood work completed that she has showed low  prolactin level and this is concerning for her.  She is wondering who she should see for ongoing treatment and management of this.  I recommended that she see an OB/GYN for further evaluation of this.  She should not need a referral for this but if she does I would place one for her.    6. Reactive thrombocytosis  Patient appears to have a reactive thrombocytosis with a pseudohyperkalemia.  I would like to reevaluate this in 2 weeks with a repeat CBC and differential as well as a repeat potassium.  I will also have patient have an iron and transferrin binding capacity added on to previous blood work if able as well as a differential added onto the blood work collected.  Patient will be notified of these results.  - Iron and Transferrin Iron Binding Capacity  - HM1(CBC and Differential); Future  - HM1(CBC and Differential); Future  - Potassium; Future    Surgical Procedure Risk: Low (reported cardiac risk generally < 1%)  Have you had prior anesthesia?: Yes  Have you or any family members had a previous anesthesia reaction:  No  Do you or any family members have a history of a clotting or bleeding disorder?: No  Cardiac Risk Assessment: no increased risk for major cardiac complications    Patient approved for surgery with general or local anesthesia.    Functional Status: Independent  Patient plans to recover at home with family.     Subjective:      Hien Palafox is a 54 y.o. female who presents for a preoperative consultation.  She was found to have a cyst on her pancreas and it has been recommended that she have an endoscopy to evaluate further. SH will also have an MRI completed to assess as well. She does have a history of chronic constipation despite symptomatic at home management with water, fiber, exercise, and miralax. She has chronic abdominal discomfort. She has not any unexplained weight loss. Has concerns of her hormones. She has been on hormone therapy in the past and has been prescribed estradiol  vaginal 2x per week. She will take propanolol for anxiety as needed. She has long history of depression and anxiety. She is needing a referral for psych today.     All other systems reviewed and are negative, other than those listed in the HPI.    Pertinent History  Do you have difficulty breathing or chest pain after walking up a flight of stairs: No  History of obstructive sleep apnea: No  Steroid use in the last 6 months: Yes: prednisone  Frequent Aspirin/NSAID use: No  Prior Blood Transfusion: No  Prior Blood Transfusion Reaction: No  If for some reason prior to, during or after the procedure, if it is medically indicated, would you be willing to have a blood transfusion?:  There is no transfusion refusal.    Current Outpatient Medications   Medication Sig Dispense Refill     multivitamin (ONE A DAY) per tablet Take 1 tablet by mouth.       polyethylene glycol (GLYCOLAX) 17 gram/dose powder TAKE 17 GM EVERY DAY AS DIRECTED       ranitidine (ZANTAC) 150 MG tablet Take 150 mg by mouth.       traZODone (DESYREL) 50 MG tablet Take 50-75 mg by mouth.       estradiol (VAGIFEM) 10 mcg Tab I 1 T VAGINALLY 2 TIMES A WK  4     multivitamin, stress formula (STRESS FORMULA) Tab Take 1 tablet by mouth.       omega-3 acid ethyl esters (LOVAZA) 1 gram capsule Take 1 g by mouth.       propranolol (INDERAL) 10 MG tablet TAKE 1 TAB BY MOUTH THREE TIMES A DAY AS NEEDED  3     No current facility-administered medications for this visit.         Allergies   Allergen Reactions     Lubiprostone Other (See Comments) and Unknown       Patient Active Problem List   Diagnosis     Abnormal auditory perception     SANDRA positive     Anxiety state     Shoulder joint pain     Chronic constipation     Dry eye syndrome     Depression     Fibromyalgia     GERD (gastroesophageal reflux disease)     Insomnia     Moderate episode of recurrent major depressive disorder (H)     Primary osteoarthritis of both hands     Somatic dysfunction of pelvic  "region     Ureteral calculus, left       Past Medical History:   Diagnosis Date     Anxiety      Constipation      Depression        Past Surgical History:   Procedure Laterality Date     BUNIONECTOMY Left        Social History     Socioeconomic History     Marital status: Single     Spouse name: Not on file     Number of children: Not on file     Years of education: Not on file     Highest education level: Not on file   Occupational History     Not on file   Social Needs     Financial resource strain: Not on file     Food insecurity:     Worry: Not on file     Inability: Not on file     Transportation needs:     Medical: Not on file     Non-medical: Not on file   Tobacco Use     Smoking status: Never Smoker     Smokeless tobacco: Never Used   Substance and Sexual Activity     Alcohol use: Yes     Comment: 3-4 beers socially.      Drug use: Not Currently     Sexual activity: Not Currently     Partners: Male   Lifestyle     Physical activity:     Days per week: Not on file     Minutes per session: Not on file     Stress: Not on file   Relationships     Social connections:     Talks on phone: Not on file     Gets together: Not on file     Attends Yarsanism service: Not on file     Active member of club or organization: Not on file     Attends meetings of clubs or organizations: Not on file     Relationship status: Not on file     Intimate partner violence:     Fear of current or ex partner: Not on file     Emotionally abused: Not on file     Physically abused: Not on file     Forced sexual activity: Not on file   Other Topics Concern     Not on file   Social History Narrative     Not on file       Patient Care Team:  Martha Gutierrez CNP as PCP - General (Family Medicine)          Objective:     Vitals:    04/18/19 1107   BP: 110/80   Pulse: 76   Resp: 16   Temp: 98.1  F (36.7  C)   Weight: 117 lb (53.1 kg)   Height: 5' 3\" (1.6 m)         Physical Exam:  General appearance: alert, appears stated age and " cooperative  Head: Normocephalic, without obvious abnormality, atraumatic  Eyes: conjunctivae/corneas clear. PERRL, EOM's intact. Fundi benign.  Ears: normal TM's and external ear canals both ears  Nose: Nares normal. Septum midline. Mucosa normal. No drainage or sinus tenderness.  Throat: lips, mucosa, and tongue normal; teeth and gums normal  Neck: no adenopathy, no carotid bruit, no JVD, supple, symmetrical, trachea midline and thyroid not enlarged, symmetric, no tenderness/mass/nodules  Back: symmetric, no curvature. ROM normal. No CVA tenderness.  Lungs: clear to auscultation bilaterally  Heart: regular rate and rhythm, S1, S2 normal, no murmur, click, rub or gallop  Abdomen: soft, non-tender; bowel sounds normal; no masses,  no organomegaly  Extremities: extremities normal, atraumatic, no cyanosis or edema  Pulses: 2+ and symmetric  Skin: Skin color, texture, turgor normal. No rashes or lesions  Lymph nodes: Cervical, supraclavicular, and axillary nodes normal.  Neurologic: Grossly normal      There are no Patient Instructions on file for this visit.      Labs:  Recent Results (from the past 24 hour(s))   Basic Metabolic Panel    Collection Time: 04/18/19 11:48 AM   Result Value Ref Range    Sodium 140 136 - 145 mmol/L    Potassium 5.6 (H) 3.5 - 5.0 mmol/L    Chloride 102 98 - 107 mmol/L    CO2 25 22 - 31 mmol/L    Anion Gap, Calculation 13 5 - 18 mmol/L    Glucose 80 70 - 125 mg/dL    Calcium 10.3 8.5 - 10.5 mg/dL    BUN 17 8 - 22 mg/dL    Creatinine 0.76 0.60 - 1.10 mg/dL    GFR MDRD Af Amer >60 >60 mL/min/1.73m2    GFR MDRD Non Af Amer >60 >60 mL/min/1.73m2   HM2(CBC w/o Differential)    Collection Time: 04/18/19 11:48 AM   Result Value Ref Range    WBC 9.8 4.0 - 11.0 thou/uL    RBC 4.77 3.80 - 5.40 mill/uL    Hemoglobin 14.0 12.0 - 16.0 g/dL    Hematocrit 42.6 35.0 - 47.0 %    MCV 89 80 - 100 fL    MCH 29.3 27.0 - 34.0 pg    MCHC 32.9 32.0 - 36.0 g/dL    RDW 11.7 11.0 - 14.5 %    Platelets 475 (H) 140 -  440 thou/uL    MPV 7.4 7.0 - 10.0 fL         There is no immunization history on file for this patient.        Electronically signed by Martha Gutierrez CNP 04/19/19 11:09 AM

## 2021-09-18 ENCOUNTER — HEALTH MAINTENANCE LETTER (OUTPATIENT)
Age: 56
End: 2021-09-18

## 2021-09-19 ENCOUNTER — APPOINTMENT (OUTPATIENT)
Dept: CT IMAGING | Facility: CLINIC | Age: 56
End: 2021-09-19
Attending: PHYSICIAN ASSISTANT
Payer: COMMERCIAL

## 2021-09-19 ENCOUNTER — HOSPITAL ENCOUNTER (EMERGENCY)
Facility: CLINIC | Age: 56
Discharge: HOME | End: 2021-09-19
Attending: PHYSICIAN ASSISTANT | Admitting: PHYSICIAN ASSISTANT
Payer: COMMERCIAL

## 2021-09-19 VITALS
SYSTOLIC BLOOD PRESSURE: 115 MMHG | HEIGHT: 63 IN | HEART RATE: 93 BPM | BODY MASS INDEX: 19.49 KG/M2 | TEMPERATURE: 98.1 F | OXYGEN SATURATION: 96 % | WEIGHT: 110 LBS | RESPIRATION RATE: 16 BRPM | DIASTOLIC BLOOD PRESSURE: 81 MMHG

## 2021-09-19 DIAGNOSIS — S09.90XA CLOSED HEAD INJURY, INITIAL ENCOUNTER: ICD-10-CM

## 2021-09-19 DIAGNOSIS — S00.83XA CONTUSION OF CHIN, INITIAL ENCOUNTER: ICD-10-CM

## 2021-09-19 DIAGNOSIS — S01.81XA CHIN LACERATION, INITIAL ENCOUNTER: ICD-10-CM

## 2021-09-19 PROCEDURE — 271N000002 HC RX 271: Performed by: PHYSICIAN ASSISTANT

## 2021-09-19 PROCEDURE — 250N000009 HC RX 250: Performed by: PHYSICIAN ASSISTANT

## 2021-09-19 PROCEDURE — 250N000011 HC RX IP 250 OP 636: Performed by: PHYSICIAN ASSISTANT

## 2021-09-19 PROCEDURE — 70450 CT HEAD/BRAIN W/O DYE: CPT

## 2021-09-19 PROCEDURE — 70486 CT MAXILLOFACIAL W/O DYE: CPT

## 2021-09-19 PROCEDURE — 90471 IMMUNIZATION ADMIN: CPT | Performed by: PHYSICIAN ASSISTANT

## 2021-09-19 PROCEDURE — 99285 EMERGENCY DEPT VISIT HI MDM: CPT | Mod: 25

## 2021-09-19 PROCEDURE — 90715 TDAP VACCINE 7 YRS/> IM: CPT | Performed by: PHYSICIAN ASSISTANT

## 2021-09-19 PROCEDURE — 12011 RPR F/E/E/N/L/M 2.5 CM/<: CPT

## 2021-09-19 RX ORDER — METHYLCELLULOSE 4000CPS 30 %
POWDER (GRAM) MISCELLANEOUS ONCE
Status: COMPLETED | OUTPATIENT
Start: 2021-09-19 | End: 2021-09-19

## 2021-09-19 RX ADMIN — Medication 150 MG: at 16:22

## 2021-09-19 RX ADMIN — EPINEPHRINE BITARTRATE 3 ML: 1 POWDER at 16:21

## 2021-09-19 RX ADMIN — CLOSTRIDIUM TETANI TOXOID ANTIGEN (FORMALDEHYDE INACTIVATED), CORYNEBACTERIUM DIPHTHERIAE TOXOID ANTIGEN (FORMALDEHYDE INACTIVATED), BORDETELLA PERTUSSIS TOXOID ANTIGEN (GLUTARALDEHYDE INACTIVATED), BORDETELLA PERTUSSIS FILAMENTOUS HEMAGGLUTININ ANTIGEN (FORMALDEHYDE INACTIVATED), BORDETELLA PERTUSSIS PERTACTIN ANTIGEN, AND BORDETELLA PERTUSSIS FIMBRIAE 2/3 ANTIGEN 0.5 ML: 5; 2; 2.5; 5; 3; 5 INJECTION, SUSPENSION INTRAMUSCULAR at 18:00

## 2021-09-19 ASSESSMENT — ENCOUNTER SYMPTOMS
VOMITING: 0
NECK PAIN: 0
HEADACHES: 1
ARTHRALGIAS: 1

## 2021-09-19 ASSESSMENT — MIFFLIN-ST. JEOR: SCORE: 1063.09
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
## 2021-09-19 NOTE — ED NOTES
Emergency Department Technician Wound Irrigation Note:    9/19/2021    5:23 PM      Wound location:  chin    Irrigation Fluid: Normal Saline    Estimated Irrigation Volume (60 mL fluid per cm): 1000mL    Martita Trujillo

## 2021-09-19 NOTE — DISCHARGE INSTRUCTIONS
Discharge Instructions  Laceration (Cut)    You were seen today for a laceration (cut).  Your provider examined your laceration for any problems such a buried foreign body (like glass, a splinter, or gravel), or injury to blood vessels, tendons, and nerves.  Your provider may have also rinsed and/or scrubbed your laceration to help prevent an infection. It may not be possible to find all problems with your laceration on the first visit; occasionally foreign bodies or a tendon injury can go undetected.    Your laceration may have been closed in one of several ways:  No closure: many wounds will heal just fine without closure.  Stitches: regular stitches that require removal.  Staples: skin staples are often used in the scalp/head.  Wound adhesive (glue): skin glue can be used for certain lacerations and doesn t require removal.  Wound strips (aka Butterfly bandages or steri-strips): these are bandages that help to close a wound.  Absorbable stitches:  dissolving  stitches that go away on their own and usually don t require removal.    A small percentage of wounds will develop an infection regardless of how well the wound is cared for. Antibiotics are generally not indicated to prevent an infection so are only given for a small number of high-risk wounds. Some lacerations are too high risk to close, and are left open to heal because closure can increase the likelihood that an infection will develop.    Remember that all lacerations, no matter how expertly repaired, will cause scarring. We consider many factors, techniques, and materials, in our efforts to provide the best possible cosmetic outcome.    Generally, every Emergency Department visit should have a follow-up clinic visit with either a primary or a specialty clinic/provider. Please follow-up as instructed by your emergency provider today.     Return to the Emergency Department right away if:  You have more redness, swelling, pain, drainage (pus), a bad smell,  or red streaking from your laceration as these symptoms could indicate an infection.  You have a fever of 100.4 F or more.  You have bleeding that you cannot stop at home. If your cut starts to bleed, hold pressure on the bleeding area with a clean cloth or put pressure over the bandage.  If the bleeding does not stop after using constant pressure for 30 minutes, you should return to the Emergency Department for further treatment.  An area past the laceration is cool, pale, or blue compared with the other side, or has a slower return of color when squeezed.  Your dressing seems too tight or starts to get uncomfortable or painful. For children, signs of a problem might be irritability or restlessness.  You have loss of normal function or use of an area, such as being unable to straighten or bend a finger normally.  You have a numb area past the laceration.    Return to the Emergency Department or see your regular provider if:  The laceration starts to come open.   You have something coming out of the cut or a feeling that there is something in the laceration.  Your wound will not heal, or keeps breaking open. There can always be glass, wood, dirt or other things in any wound.  They will not always show up, even on x-rays.  If a wound does not heal, this may be why, and it is important to follow-up with your regular provider.    Home Care:  Take your dressing off in 12-24 hours, or as instructed by your provider, to check your laceration. Remove the dressing sooner if it seems too tight or painful, or if it is getting numb, tingly, or pale past the dressing.  Gently wash your laceration 1-2 times daily with clean water and mild soap. It is okay to shower or run clean water over the laceration, but do not let the laceration soak in water (no swimming).  If your laceration was closed with wound adhesive or strips: pat it dry and leave it open to the air. For all other repairs: after you wash your laceration, or at least  2 times a day, apply antibiotic ointment (such as Neosporin  or Bacitracin ) to the laceration, then cover it with a Band-Aid  or gauze.  Keep the laceration clean. Wear gloves or other protective clothing if you are around dirt.    Follow-up for removal:  If your wound was closed with staples or regular stitches, they need to be removed according to the instructions and timeline specified by your provider today.  If your wound was closed with absorbable ( dissolving ) sutures, they should fall out, dissolve, or not be visible in about one week. If they are still visible, then they should be removed according to the instructions and timeline specified by your provider today.    Scars:  To help minimize scarring:  Wear sunscreen over the healed laceration when out in the sun.  Massage the area regularly once healed.  You may apply Vitamin E to the healed wound.  Wait. Scars improve in appearance over months and years.    If you were given a prescription for medicine here today, be sure to read all of the information (including the package insert) that comes with your prescription.  This will include important information about the medicine, its side effects, and any warnings that you need to know about.  The pharmacist who fills the prescription can provide more information and answer questions you may have about the medicine.  If you have questions or concerns that the pharmacist cannot address, please call or return to the Emergency Department.       Remember that you can always come back to the Emergency Department if you are not able to see your regular provider in the amount of time listed above, if you get any new symptoms, or if there is anything that worries you.  Discharge Instructions  Concussion    You were seen today for signs of a concussion.  The symptoms will vary, depending on the nature of your injury and your health. You may have: headache, confusion, nausea (feel sick to your stomach), vomiting  (throwing up) and problems with memory, concentrating, or sleep. You may feel dizzy, irritable, and tired. Children and teens may need help from their parents, teachers, and coaches to watch for symptoms as they recover.    Generally, every Emergency Department visit should have a follow-up clinic visit with either a primary or a specialty clinic/provider. Please follow-up as instructed by your emergency provider today.     Return to the Emergency Department if:  Your headache gets worse or you start to have a really bad headache even with the recommended treatment plan.   You feel drowsier, have growing confusion, or slurred speech.   You keep repeating yourself.   You have strange behavior or are feeling more irritable.   You have a seizure.   You vomit (throw up) more than once.   You have trouble walking.   You have weakness or numbness.  Your neck pain gets worse.   You have a loss of consciousness.   You have blood for fluid coming from your ears or nose.   You have new symptoms or anything that worries you.     Home Care:  Get lots of rest and get enough sleep at night. Take daytime naps or rest if you feel tired.   Limit physical activity and  thinking  activities. These can make symptoms worse.   Physical activities include gym, sports, weight training, running, exercise, and heavy lifting.   Thinking activities include homework, class work, job-related work, and screen time (phone, computer, tablet, TV, and video games).   Stick to a healthy diet and drink lots of fluids. Avoid alcohol.  As symptoms improve, you may slowly return to your daily activities. If symptoms get worse or return, reduce your activity.   Know that it is normal to feel sad or frustrated when you do not feel right and are less active.     Going Back to Work:  Your care team will tell you when you are ready to return to work.    Limit the amount of work you do soon after your injury. This may speed healing. Take breaks if your symptoms  get worse. You should also reduce your physical activity as well as activities that require a lot of thinking until you see your doctor. You may need shorter work days and a lighter workload.  Avoid heavy lifting, working with machinery, driving and working at heights until your symptoms are gone or you are cleared by a provider.    Going Back to School:  If you are still having symptoms, you may need extra help at school.  Tell your teachers and school nurse about your injury and symptoms. Ask them to watch for problems with learning, memory, and concentrating. Symptoms may get worse when you do schoolwork, and you may become more irritable. You may need shorter school days, a reduced workload, and to postpone testing.  Do not drive or take gym class (physical activity) until cleared by a provider.    Returning to Sports:  Never return to play if you have any symptoms. A full recovery will reduce the chances of getting hurt again. Remember, it is better to miss one or two games than a whole season.  You should rest from all physical activity until you see your provider. Generally, if all symptoms have completely cleared, your provider can help guide you to slowly return to sports. If symptoms return or worsen, stop the activity and see your provider.  Important: If you are in an organized sport and under age 18, you will need written consent from a healthcare provider before you return to sports. Typically, this will be your primary care or sports medicine provider. Please make an appointment.    If you were given a prescription for medicine here today, be sure to read all of the information (including the package insert) that comes with your prescription.  This will include important information about the medicine, its side effects, and any warnings that you need to know about.  The pharmacist who fills the prescription can provide more information and answer questions you may have about the medicine.  If you have  questions or concerns that the pharmacist cannot address, please call or return to the Emergency Department.     Remember that you can always come back to the Emergency Department if you are not able to see your regular provider in the amount of time listed above, if you get any new symptoms, or if there is anything that worries you.

## 2022-01-08 ENCOUNTER — HEALTH MAINTENANCE LETTER (OUTPATIENT)
Age: 57
End: 2022-01-08

## 2022-08-16 ENCOUNTER — TRANSFERRED RECORDS (OUTPATIENT)
Dept: HEALTH INFORMATION MANAGEMENT | Facility: CLINIC | Age: 57
End: 2022-08-16

## 2022-09-22 ENCOUNTER — OFFICE VISIT (OUTPATIENT)
Dept: PLASTIC SURGERY | Facility: CLINIC | Age: 57
End: 2022-09-22

## 2022-09-22 DIAGNOSIS — Z41.1 ENCOUNTER FOR COSMETIC PROCEDURE: Primary | ICD-10-CM

## 2022-09-22 NOTE — LETTER
9/22/2022       RE: Hien Palafox  [address]  [facility] [address]     Dear Colleague,    Thank you for referring your patient, Hien Palafox, to the [facility] at [facility]. Please see a copy of my visit note below.    This office note has been dictated.     Service Date: 09/22/2022    HISTORY OF PRESENT ILLNESS: Ms. Palafox is a woman whom I met back in January of 2020 for evaluation of facial aging.  She has significant laxity and it is particularly problematic in the neck.  I talked to her previously about fat transfer to the cheeks, SMAS augmentation of the lips, a facelift.      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  She is single, but has family that lives in the neighborhood.  She knows that she would need to have somebody with her the first few days after surgery.  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      PAST SURGICAL HISTORY:   1) She had a surgical correction to the right hip area in 2015.    2) Bunionectomy in 2012.      PAST MEDICAL HISTORY: She sustained an injury to the soft tissues of her chin in the past that have left a somewhat hypopigmented and depressed scar, but over time, she has also developed chin ptosis.  How much of this is related to the injury I cannot be completely certain.      MEDICATIONS:    1) Trazodone for sleep.    2) Cymbalta for depression with good effect.    3) Losartan 50 mg for hypertension and it is well controlled.    4) Omeprazole for gastroesophageal reflux.      PHYSICAL EXAMINATION: She does have a markedly tired chin pad with a very prominent chin.  There is volume loss in the temple areas and the cheeks.  She has very thin lips.  This has been a long term problem that has been aggravated with aging.  She has a long upper lip.      RECOMMENDATIONS/PLAN: In our further  conversation today, she would like the chin pad corrected.  I would do this with a submental incision, trim off some of the volume excess.  I would not fold it up over the chin as she already has an excessively prominent chin and then create some contour correction to fill in the depression where the ligaments at the mentum attach beneath the mandible.  This I think would make a significant volume improvement.  I do not think she needs other work laterally in the submandibular triangles.  I would carry out a facelift for her.  We talked about the incisions, complications, risks including motor and sensory problems, scarring, infection, skin slough, inadequate correction, that we will never make her skin and the neck as tight as she would like.  I would harvest abdominal fat and place it into the cheeks and into the temporal hallows.  We talked about implants but I think the amount of volume correction she needs does not warrant an implant at this point.  We talked about transient or inadequate correction of fat, even the rare potential of it growing.  I would do a subnasal lip lift and then SMAS augmentation of both the upper and lower lips.  The risks and benefits of all of these issues were discussed with her.      If she wishes to proceed with surgery, I would be happy to help her.  She may have more questions and I told her she should feel comfortable calling us if she does so.        Godwin Peters MD        D: 2022   T: 2022   MT: ms    Name:     MICHAEL CHAVEZ  MRN:      [id number]        Account:      [id number]   :      1965           Service Date: 2022       Document: [id number]

## 2022-09-22 NOTE — LETTER
September 22, 2022  Re: Hien Palafox  1965      Thank you so much for referring Hien Palafox to the [facility]. I had the pleasure of visiting with Hien today.     Attached you will find a copy of my note. Please feel free to reach out to me with any questions, [phone].     This office note has been dictated.     Service Date: 09/22/2022    HISTORY OF PRESENT ILLNESS: Ms. Palafox is a woman whom I met back in January of 2020 for evaluation of facial aging.  She has significant laxity and it is particularly problematic in the neck.  I talked to her previously about fat transfer to the cheeks, SMAS augmentation of the lips, a facelift.      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  She is single, but has family that lives in the neighborhood.  She knows that she would need to have somebody with her the first few days after surgery.  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      PAST SURGICAL HISTORY:   1) She had a surgical correction to the right hip area in 2015.    2) Bunionectomy in 2012.      PAST MEDICAL HISTORY: She sustained an injury to the soft tissues of her chin in the past that have left a somewhat hypopigmented and depressed scar, but over time, she has also developed chin ptosis.  How much of this is related to the injury I cannot be completely certain.      MEDICATIONS:    1) Trazodone for sleep.    2) Cymbalta for depression with good effect.    3) Losartan 50 mg for hypertension and it is well controlled.    4) Omeprazole for gastroesophageal reflux.      PHYSICAL EXAMINATION: She does have a markedly tired chin pad with a very prominent chin.  There is volume loss in the temple areas and the cheeks.  She has very thin lips.  This has been a long term problem that has been aggravated with aging.  She has a long upper lip.      RECOMMENDATIONS/PLAN: In our  further conversation today, she would like the chin pad corrected.  I would do this with a submental incision, trim off some of the volume excess.  I would not fold it up over the chin as she already has an excessively prominent chin and then create some contour correction to fill in the depression where the ligaments at the mentum attach beneath the mandible.  This I think would make a significant volume improvement.  I do not think she needs other work laterally in the submandibular triangles.  I would carry out a facelift for her.  We talked about the incisions, complications, risks including motor and sensory problems, scarring, infection, skin slough, inadequate correction, that we will never make her skin and the neck as tight as she would like.  I would harvest abdominal fat and place it into the cheeks and into the temporal hallows.  We talked about implants but I think the amount of volume correction she needs does not warrant an implant at this point.  We talked about transient or inadequate correction of fat, even the rare potential of it growing.  I would do a subnasal lip lift and then SMAS augmentation of both the upper and lower lips.  The risks and benefits of all of these issues were discussed with her.      If she wishes to proceed with surgery, I would be happy to help her.  She may have more questions and I told her she should feel comfortable calling us if she does so.        Godwin Peters MD        D: 2022   T: 2022   MT: ms    Name:     MICHAEL CHAVEZ  MRN:      [id number]        Account:      [id number]   :      1965           Service Date: 2022       Document: [id number]

## 2022-09-23 NOTE — PROGRESS NOTES
Service Date: 09/22/2022    HISTORY OF PRESENT ILLNESS: Ms. Palafox is a woman whom I met back in January of 2020 for evaluation of facial aging.  She has significant laxity and it is particularly problematic in the neck.  I talked to her previously about fat transfer to the cheeks, SMAS augmentation of the lips, a facelift.      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  She is single, but has family that lives in the neighborhood.  She knows that she would need to have somebody with her the first few days after surgery.  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      PAST SURGICAL HISTORY:   1) She had a surgical correction to the right hip area in 2015.    2) Bunionectomy in 2012.      PAST MEDICAL HISTORY: She sustained an injury to the soft tissues of her chin in the past that have left a somewhat hypopigmented and depressed scar, but over time, she has also developed chin ptosis.  How much of this is related to the injury I cannot be completely certain.      MEDICATIONS:    1) Trazodone for sleep.    2) Cymbalta for depression with good effect.    3) Losartan 50 mg for hypertension and it is well controlled.    4) Omeprazole for gastroesophageal reflux.      PHYSICAL EXAMINATION: She does have a markedly tired chin pad with a very prominent chin.  There is volume loss in the temple areas and the cheeks.  She has very thin lips.  This has been a long term problem that has been aggravated with aging.  She has a long upper lip.      RECOMMENDATIONS/PLAN: In our further conversation today, she would like the chin pad corrected.  I would do this with a submental incision, trim off some of the volume excess.  I would not fold it up over the chin as she already has an excessively prominent chin and then create some contour correction to fill in the depression where the ligaments at the mentum attach beneath the  mandible.  This I think would make a significant volume improvement.  I do not think she needs other work laterally in the submandibular triangles.  I would carry out a facelift for her.  We talked about the incisions, complications, risks including motor and sensory problems, scarring, infection, skin slough, inadequate correction, that we will never make her skin and the neck as tight as she would like.  I would harvest abdominal fat and place it into the cheeks and into the temporal hallows.  We talked about implants but I think the amount of volume correction she needs does not warrant an implant at this point.  We talked about transient or inadequate correction of fat, even the rare potential of it growing.  I would do a subnasal lip lift and then SMAS augmentation of both the upper and lower lips.  The risks and benefits of all of these issues were discussed with her.      If she wishes to proceed with surgery, I would be happy to help her.  She may have more questions and I told her she should feel comfortable calling us if she does so.        Godwin Peters MD        D: 2022   T: 2022   MT: ms    Name:     MICHAEL CHAVEZ  MRN:      [id number]        Account:      [id number]   :      1965           Service Date: 2022       Document: [id number]

## 2022-11-19 ENCOUNTER — HEALTH MAINTENANCE LETTER (OUTPATIENT)
Age: 57
End: 2022-11-19

## 2023-03-17 ENCOUNTER — TELEPHONE (OUTPATIENT)
Dept: CALL CENTER | Age: 58
End: 2023-03-17
Payer: COMMERCIAL

## 2023-03-17 DIAGNOSIS — R69 DIAGNOSIS UNKNOWN: Primary | ICD-10-CM

## 2023-04-15 ENCOUNTER — HEALTH MAINTENANCE LETTER (OUTPATIENT)
Age: 58
End: 2023-04-15

## 2023-11-19 ENCOUNTER — HEALTH MAINTENANCE LETTER (OUTPATIENT)
Age: 58
End: 2023-11-19

## 2024-06-16 ENCOUNTER — HEALTH MAINTENANCE LETTER (OUTPATIENT)
Age: 59
End: 2024-06-16

## 2024-07-11 ENCOUNTER — TELEPHONE (OUTPATIENT)
Dept: BEHAVIORAL HEALTH | Facility: CLINIC | Age: 59
End: 2024-07-11

## 2024-07-11 NOTE — TELEPHONE ENCOUNTER
"Pt is a(n) adult (18+ out of HS) Seeking as eval for Adult Mental Health DA for Programmatic Care - Program Preference? Yes: 55+ .  Appointment scheduled by:  Patient.  (self-pay - complete Cost Estimate)  Caller name:  Hien Palafox     Caller phone #: [phone]  Legal Guardianship Reviewed?  No  Honoring Choices Notified?  No  Brief reason for appt:  MH eval     needed?  NO    Contact information verified/updated: Yes    If appt is for adult [facility] program location, confirm you have verified the location and address with the patient referring to the template header.  Yes    Ludivina Brown    \"We have scheduled your evaluation. In the event that your insurance coverage comes back as out of network, you may receive a call to cancel your appointment and direct you to your insurance company for in-network coverage.\"    Disclaimer regarding insurance read to patient?  Yes   "

## 2024-07-16 ENCOUNTER — HOSPITAL ENCOUNTER (OUTPATIENT)
Dept: BEHAVIORAL HEALTH | Facility: CLINIC | Age: 59
Discharge: HOME | End: 2024-07-16
Attending: PSYCHIATRY & NEUROLOGY | Admitting: PSYCHIATRY & NEUROLOGY
Payer: COMMERCIAL

## 2024-07-16 ENCOUNTER — TELEPHONE (OUTPATIENT)
Dept: BEHAVIORAL HEALTH | Facility: CLINIC | Age: 59
End: 2024-07-16
Payer: COMMERCIAL

## 2024-07-16 PROCEDURE — 90791 PSYCH DIAGNOSTIC EVALUATION: CPT

## 2024-07-16 ASSESSMENT — COLUMBIA-SUICIDE SEVERITY RATING SCALE - C-SSRS
2. HAVE YOU ACTUALLY HAD ANY THOUGHTS OF KILLING YOURSELF?: NO
2. HAVE YOU ACTUALLY HAD ANY THOUGHTS OF KILLING YOURSELF?: YES
5. HAVE YOU STARTED TO WORK OUT OR WORKED OUT THE DETAILS OF HOW TO KILL YOURSELF? DO YOU INTEND TO CARRY OUT THIS PLAN?: NO
4. HAVE YOU HAD THESE THOUGHTS AND HAD SOME INTENTION OF ACTING ON THEM?: YES
3. HAVE YOU BEEN THINKING ABOUT HOW YOU MIGHT KILL YOURSELF?: YES
1. IN THE PAST MONTH, HAVE YOU WISHED YOU WERE DEAD OR WISHED YOU COULD GO TO SLEEP AND NOT WAKE UP?: NO
5. HAVE YOU STARTED TO WORK OUT OR WORKED OUT THE DETAILS OF HOW TO KILL YOURSELF? DO YOU INTEND TO CARRY OUT THIS PLAN?: YES
4. HAVE YOU HAD THESE THOUGHTS AND HAD SOME INTENTION OF ACTING ON THEM?: NO
1. HAVE YOU WISHED YOU WERE DEAD OR WISHED YOU COULD GO TO SLEEP AND NOT WAKE UP?: YES

## 2024-07-16 ASSESSMENT — ANXIETY QUESTIONNAIRES
6. BECOMING EASILY ANNOYED OR IRRITABLE: SEVERAL DAYS
4. TROUBLE RELAXING: NEARLY EVERY DAY
GAD7 TOTAL SCORE: 15
2. NOT BEING ABLE TO STOP OR CONTROL WORRYING: NEARLY EVERY DAY
5. BEING SO RESTLESS THAT IT IS HARD TO SIT STILL: NOT AT ALL
IF YOU CHECKED OFF ANY PROBLEMS ON THIS QUESTIONNAIRE, HOW DIFFICULT HAVE THESE PROBLEMS MADE IT FOR YOU TO DO YOUR WORK, TAKE CARE OF THINGS AT HOME, OR GET ALONG WITH OTHER PEOPLE: VERY DIFFICULT
1. FEELING NERVOUS, ANXIOUS, OR ON EDGE: NEARLY EVERY DAY
GAD7 TOTAL SCORE: 15
3. WORRYING TOO MUCH ABOUT DIFFERENT THINGS: NEARLY EVERY DAY
7. FEELING AFRAID AS IF SOMETHING AWFUL MIGHT HAPPEN: MORE THAN HALF THE DAYS

## 2024-07-16 ASSESSMENT — PATIENT HEALTH QUESTIONNAIRE - PHQ9: SUM OF ALL RESPONSES TO PHQ QUESTIONS 1-9: 19

## 2024-07-16 ASSESSMENT — PAIN SCALES - GENERAL: PAINLEVEL: SEVERE PAIN (6)

## 2024-07-16 NOTE — PROGRESS NOTES
LOCUS Worksheet     Name: Hien Palafox MRN: [id number]    : 1965      Gender:  female    PMI:  NA   Provider Name: [facility]   Provider NPI:  [id number]    Actual level of Care Provided:  Assessment and Referral     Service(s) receiving or referred to:  IOP/DT program for Seniors 55+    Reason for Variance: Anxiety and Depression      Rating completed by: Aaron Galvez LPCC/LADC      I. Risk of Harm:   2      Low Risk of Harm    II. Functional Status:   2      Mild Impairment    III. Co-Morbidity:   2      Minor Co-Morbidity    IV - A. Recovery Environment - Level of Stress:   4      Highly Stress Environment    IV - B. Recovery Environment - Level of Support:   3      Limited Support in Environment    V. Treatment and Recovery History:   3      Moderate to Equivocal Response to Treatment and Recovery Management    VI. Engagement and Recovery Project:   2      Positive Engagement and Recovery       18 Composite Score    Level of Care Recommendation:   17 to 19       High Intensity Community Based Services
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
## 2024-07-16 NOTE — TELEPHONE ENCOUNTER
Summary of Patient Care Communication Handoff to Patient Navigator Coordinator    PATIENT'S NAME: Hien Palafox  MRN:   [id number]  :   1965    DATE OF SERVICE: 24    Referral Needed: Yes    Is the patient coming from an inpatient unit? No    What program is this referral for? Adult Mental Health Referral    Level of Care Recommended:  Combined Intensive Outpatient Day Treatment Program (IOP-ADT) / Adult Day Treatment Program (ADT)    Specialty Track Recommendations:  MH Specialty Tracks: 55 Plus    Schedule Preferences: Schedule Preference: Afternoons Preferred    Are there any potential barriers for entrance into programmatic care? NA    Followed up from  Needed?:  No    Mental Health Referral Needed: No    Release of Information Needed:  NA    Faxing Needed: No    Follow up Requests:  Patient Navigator Coordinator Follow-Up Needed: Referral to designated [place] Program.    Comments: Referral to IOP/DT program for Senior 55+.     Aaron Galvez [facility]        Patient Navigator Coordinator Contact Information  Pool Message: dept-[email or web address]-[email or web address] ([id number])   Phone:  [phone]  Fax:  [phone]  Email:  [email or web address]

## 2024-07-16 NOTE — PROGRESS NOTES
"Outpatient Mental Health Services - Adult    MY COPING PLAN FOR SAFETY    PATIENT'S NAME: Hien Palafox  MRN:   [id number]    SAFETY PLAN:    Step 1: Warning signs / cues (Thoughts, images, mood, situation, behavior) that a crisis may be developing:    Thoughts: \"I don't matter\", \"People would be better off without me\", \"I'm a burden\", \"I can't do this anymore\", \"I just want this to end\", and \"Nothing makes it better\"  Images: obsessive thoughts of death or dying:  , flashbacks, and visions of harm:    Thinking Processes: ruminations (can't stop thinking about my problems):  , racing thoughts, intrusive thoughts (bothersome, unwanted thoughts that come out of nowhere):  , highly critical and negative thoughts:  , and disorganized thinking:    Mood: worsening depression, hopelessness, helplessness, intense worry, agitation, and mood swings  Behaviors: isolating/withdrawing , using alcohol, not taking care of myself, not taking care of my responsibilities, sleeping too much, and not sleeping enough  Situations: changes in symptoms: Anxiety and Depression, pain, relationship problems, trauma , and financial stress     Step 2: Coping strategies - Things I can do to take my mind off of my problems without contacting another person (relaxation technique, physical activity):    Distress Tolerance Strategies:  relaxation activities: Varies, read a book: arts and crafts, varies , and paced breathing/progressive muscle relaxation.  Physical Activities: go for a walk, exercise: varies, stretching , and palatis   Focus on helpful thoughts:  \"This is temporary\", \"I will get through this\", \"It always passes\", \"Ride the wave\", think about happy memories:  , remind myself of what is important to me: my family and friends, and self-compassion statements:      Step 3: People and social settings that provide distraction: [facility] Crisis Line and My Therapist     Name: [facility] Crisis Line and My " Therapist Phone: ADRIANA   Name: ADRIANA Phone: ADRIANA   Walking around the lake, hiking in the park or on the trail.       Step 4: Remind myself of people and things that are important to me and worth living for:  My Family    Step 5: When I am in crisis, I can ask these people to help me use my safety plan: [facility], My sister Selene and My Therapist     Name: [facility], My sister Selene and My Therapist Phone: ADRIANA   Name: ADRIANA Phone: ADRIANA    Step 6: Making the environment safe:     remove alcohol and be around others    Step 7: Professionals or agencies I can contact during a crisis:    Suicide Prevention Lifeline: 6-457-554-TALK (7675)  Crisis Text Line Service (available 24 hours a day, 7 days a week): Text [place] to 533252  Call  **CRISIS (863706) from a cell phone to talk to a team of professionals who can help you.    Crisis Services By [facility]: Phone Number:   [place]     [phone]   [place]    [phone]   [place]    [phone]   [place]    [phone]   [place]    [phone]   [place] [phone]   [place]     [phone]     Call 911 or go to my nearest emergency department.     I helped develop this safety plan and agree to use it when needed.  I have been given a copy of this plan.      Client signature _________________________________________________________________  Today s date:  7/16/2024  Adapted from Safety Plan Template 2008 Alexandra Silva and Kelby Burns is reprinted with the express permission of the authors.  No portion of the Safety Plan Template may be reproduced without the express, written permission.  You can contact the authors at [email or web address] [email or web address] or [email or web address]

## 2024-07-17 NOTE — TELEPHONE ENCOUNTER
**Patient Navigator Follow Up**    7/17/2024;    Referral for IOP-ADT; 55 plus Afternoons    I left  for patient to call me back to discuss programming.  I provided my direct call back phone #.      Patient returned my call and I discussed programming with her.  She wants to be added to the 55 plus afternoons waitlist.    I added her and program will follow up with her accordingly.    I sent her Program Content to review via [employer] as well.    Thank you,    Lindsey GRANT  Patient Navigator

## 2024-07-24 ENCOUNTER — TELEPHONE (OUTPATIENT)
Dept: BEHAVIORAL HEALTH | Facility: CLINIC | Age: 59
End: 2024-07-24
Payer: COMMERCIAL

## 2024-07-24 NOTE — TELEPHONE ENCOUNTER
Writer contacted patient to discuss programming and current waitlist status (explained that there will be openings approximately mid-late August); also wanted to provide patient an opportunity to ask questions regarding the program. Writer requested a call back to [phone].    Zonia Us [facility] on 7/24/2024 at 10:06 AM

## 2024-07-26 ENCOUNTER — TELEPHONE (OUTPATIENT)
Dept: BEHAVIORAL HEALTH | Facility: CLINIC | Age: 59
End: 2024-07-26
Payer: COMMERCIAL

## 2024-07-26 NOTE — TELEPHONE ENCOUNTER
Writer returned patients call, patient asked questions about program structure and insurance.     RAKESH Michaels on 7/26/2024 at 8:46 AM

## 2024-08-12 ENCOUNTER — TELEPHONE (OUTPATIENT)
Dept: BEHAVIORAL HEALTH | Facility: CLINIC | Age: 59
End: 2024-08-12
Payer: COMMERCIAL

## 2024-08-12 NOTE — TELEPHONE ENCOUNTER
Writer and patient discussed programming, patient will start in IOP/DT-3 on 8/19.    Zonia Us [facility] on 8/12/2024 at 2:17 PM

## 2024-08-12 NOTE — TELEPHONE ENCOUNTER
----- Message from Zonia Us sent at 8/12/2024  2:18 PM CDT -----  Regarding: Patient starting IOP/DT-3 on 8/19  Adult Mental Health Programmatic Care Schedule Request    Patient Name: Hien Palafox  Location of programming: [facility]  Start Date: 8/19/24    Group/PROVIDER: ADMISSION IOP AM TRACK [[id number]]   Appt Time: 12pm   Duration of Appointment in minutes: 60 minutes   Visit Type: Treatment [870]   Attending Provider: Aleks Montoya     Adult Program Group: Adult Program Group: IOP/DT 3  55+ Track [[id number]]  Schedule: M, W, Th, F 1pm-4pm  12 hours per week for 9 weeks  Number of visits to be scheduled: 36 days    Attending Provider (MD):  Dr. Aleks Montoya ([place]); Dr. Isaías Rosario ([place])  Visit Type:  In-Person    Accommodations Needed: N/A  Alerts Identified/Substantiation: N/A  Consulted with Supervisor: N/A    Send to:   [UR BEH [facility] ([id number])] ##ONLY if Start Date is determined##  NG 14 OBC's (BEH BEHAVIORAL OUTPATIENT ASSESSMENTS [[id number]])   Zonia Us ([facility])  Amarilis Gordon (PHP)  Marley Aguillon/Farhan ([facility]: PHP & IOP)

## 2024-08-19 ENCOUNTER — TELEPHONE (OUTPATIENT)
Dept: BEHAVIORAL HEALTH | Facility: CLINIC | Age: 59
End: 2024-08-19
Payer: COMMERCIAL

## 2024-08-19 NOTE — TELEPHONE ENCOUNTER
----- Message from Zonia Us sent at 8/19/2024 11:49 AM CDT -----  Regarding: Patient no longer starting  Patient is no longer interested in the program, please cancel all IOP/DT-3 appts.    Thanks,    Zonia Us, [facility] on 8/19/2024 at 11:49 AM

## 2024-08-30 ENCOUNTER — TRANSCRIBE ORDERS (OUTPATIENT)
Dept: OTHER | Age: 59
End: 2024-08-30

## 2024-08-30 DIAGNOSIS — K86.2 PANCREATIC CYST: Primary | ICD-10-CM

## 2024-08-30 DIAGNOSIS — D18.03 CAVERNOUS HEMANGIOMA OF LIVER: ICD-10-CM

## 2025-01-20 ENCOUNTER — TRANSCRIBE ORDERS (OUTPATIENT)
Dept: OTHER | Age: 60
End: 2025-01-20

## 2025-01-20 DIAGNOSIS — M62.89 PELVIC FLOOR DYSFUNCTION: ICD-10-CM

## 2025-01-20 DIAGNOSIS — K56.41 OBSTRUCTIVE DEFECATION (H): Primary | ICD-10-CM

## 2025-02-28 ENCOUNTER — TRANSFERRED RECORDS (OUTPATIENT)
Dept: HEALTH INFORMATION MANAGEMENT | Facility: CLINIC | Age: 60
End: 2025-02-28

## 2025-03-07 PROBLEM — M62.89 PELVIC FLOOR DYSFUNCTION: Status: ACTIVE | Noted: 2025-03-07

## 2025-03-07 PROBLEM — K56.41 OBSTRUCTIVE DEFECATION (H): Status: ACTIVE | Noted: 2025-03-07

## 2025-03-12 ENCOUNTER — THERAPY VISIT (OUTPATIENT)
Dept: PHYSICAL THERAPY | Facility: CLINIC | Age: 60
End: 2025-03-12
Payer: COMMERCIAL

## 2025-03-12 DIAGNOSIS — M62.89 PELVIC FLOOR DYSFUNCTION: ICD-10-CM

## 2025-03-12 DIAGNOSIS — K56.41 OBSTRUCTIVE DEFECATION (H): Primary | ICD-10-CM

## 2025-03-12 PROCEDURE — 90912 BFB TRAINING 1ST 15 MIN: CPT | Mod: 59

## 2025-03-12 PROCEDURE — 97530 THERAPEUTIC ACTIVITIES: CPT | Mod: GP

## 2025-04-07 ENCOUNTER — THERAPY VISIT (OUTPATIENT)
Dept: PHYSICAL THERAPY | Facility: CLINIC | Age: 60
End: 2025-04-07
Payer: COMMERCIAL

## 2025-04-07 DIAGNOSIS — M62.89 PELVIC FLOOR DYSFUNCTION: ICD-10-CM

## 2025-04-07 DIAGNOSIS — K56.41 OBSTRUCTIVE DEFECATION (H): Primary | ICD-10-CM

## 2025-04-07 PROCEDURE — 97530 THERAPEUTIC ACTIVITIES: CPT | Mod: GP

## 2025-04-07 PROCEDURE — 97112 NEUROMUSCULAR REEDUCATION: CPT | Mod: GP

## 2025-04-21 ENCOUNTER — THERAPY VISIT (OUTPATIENT)
Dept: PHYSICAL THERAPY | Facility: CLINIC | Age: 60
End: 2025-04-21
Payer: COMMERCIAL

## 2025-04-21 DIAGNOSIS — K56.41 OBSTRUCTIVE DEFECATION (H): Primary | ICD-10-CM

## 2025-04-21 DIAGNOSIS — M62.89 PELVIC FLOOR DYSFUNCTION: ICD-10-CM

## 2025-04-21 PROCEDURE — 97530 THERAPEUTIC ACTIVITIES: CPT | Mod: GP

## 2025-04-21 PROCEDURE — 97112 NEUROMUSCULAR REEDUCATION: CPT | Mod: GP

## 2025-06-16 ENCOUNTER — THERAPY VISIT (OUTPATIENT)
Dept: PHYSICAL THERAPY | Facility: CLINIC | Age: 60
End: 2025-06-16
Payer: COMMERCIAL

## 2025-06-16 DIAGNOSIS — K56.41 OBSTRUCTIVE DEFECATION (H): Primary | ICD-10-CM

## 2025-06-16 DIAGNOSIS — M62.89 PELVIC FLOOR DYSFUNCTION: ICD-10-CM

## 2025-06-16 PROCEDURE — 97530 THERAPEUTIC ACTIVITIES: CPT | Mod: GP

## 2025-06-16 PROCEDURE — 97140 MANUAL THERAPY 1/> REGIONS: CPT | Mod: GP

## 2025-06-16 NOTE — PROGRESS NOTES
06/16/25 0500   Appointment Info   Signing clinician's name / credentials Emilia Hill, PT, DPT   Total/Authorized Visits E & T   Visits Used 5   Medical Diagnosis Obstructive defecation (H)  Pelvic floor dysfunction   PT Tx Diagnosis Obstructive defecation (H)  Pelvic floor dysfunction   Progress Note/Certification   Start of Care Date 03/07/25   Onset of illness/injury or Date of Surgery 01/20/25   Therapy Frequency 1x per month   Predicted Duration 3 months   Certification date from 06/05/25   Certification date to 09/02/25   Progress Note Due Date 06/04/25   Progress Note Completed Date 03/07/25   PT Goal 1   Goal Identifier Bowel Movements   Goal Description Pt will have a daily bowel movement without straining 75% of the time for 2 weeks.   Rationale to maximize safety and independence with performance of ADLs and functional tasks;to maximize safety and independence with self cares   Goal Progress met.   Target Date 06/04/25   Date Met 06/16/25   Subjective Report   Subjective Report Pt had a hip replacement 4/28/25 and had some difficulties with a blood clot after. Due to iron and hydrocodone, was constipated. Pt has not used a suppository since surgery. Was taking Senna while on pain meds but has not been since immediately after surgery. Pt notes that she has been better at getting up early, eating better. Pt was also switched to Trulance, been using Miralax capful daily.   Objective Measures   Objective Measures Objective Measure 1;Objective Measure 2;Objective Measure 3   Objective Measure 1   Objective Measure Bowel Habits   Details Daily, soft formed. No straining, going when needs to go.   Objective Measure 2   Objective Measure US Assessment - NT   Details Some ability to lengthen at anorectal angle with breathing technique followed by tightening of pelvic floor, PFM tightening with just coaching bearing down as she normally would   Objective Measure 3   Objective Measure Defecation Mechanics    Details No cues needed today.   PT Modalities   PT Modalities Biofeedback PFM   Treatment Interventions (PT)   Interventions Therapeutic Activity;Manual Therapy;Neuromuscular Re-education   Therapeutic Activity   Therapeutic Activities: dynamic activities to improve functional performance minutes (69395) 24   Therapeutic Activities Ther Act 2;Ther Act 3;Ther Act 4;Ther Act 5   Ther Act 1 Education   Ther Act 1 - Details Importance of good diet and exercise for healthy bowels and healing from hip replacement, discussed that bowel love routines, body needs proteins to heal from surgery, importance of maintaning good daily bowel movements to prevent stool from backing up again, how Miralax is none habit forming, keeping stool soft to go more regularly, continue to employ techniques learned in PT, etiology of UUI   Ther Act 2 Defecation Mechanics   Ther Act 2 - Details Practiced breathing technique in supine x5, cues for abdomenal expansion with exhale, much improved   Ther Act 3 Urge Suppression   Ther Act 3 - Details Discussed normal vs pathological urge, discussed importance of not rushing to the bathroom, education provided regarding quick flicks, perineal pressure, DB, and distraction, walking slowly ot the bathroom, performing with triggers   Ther Act 4 Plan Of Care   Ther Act 4 - Details discussed progress, d/c unless new concerns arise after colonscopy or if urgency is not improving   Ther Act 5 Reviewed exercises given by hip PT, recommended continuing with transverse abdominis exercises as this can be helpful with bowel movements   Skilled Intervention Education see above.   Patient Response/Progress Pt understood and had no further questions.   Neuromuscular Re-education   Neuromuscular re-ed of mvmt, balance, coord, kinesthetic sense, posture, proprioception minutes (27086) 2   Neuromuscular Re-education Neuro Re-ed 2   Neuro Re-ed 1 Seated PFM Contraction   Neuro Re-ed 1 - Details x10, seated on towel  roll for proprioceptive feedback, cues for feel pelvic floor move against the towel   Patient Response/Progress Improved awareness of PFM movement   Manual Therapy   Manual Therapy: Mobilization, MFR, MLD, friction massage minutes (84586) 8   Manual Therapy 1 TPR   Manual Therapy 1 - Details To iliacus x2   Manual Therapy 2 Bowel Massage   Manual Therapy 2 - Details Gentle circles from R to L, demonstrated for patient with good bowel sounds, added iliocecal valve release x5, demonstrated for technique and firmness of pressure   Skilled Intervention Manual techniques see above   Patient Response/Progress No adverse effects, R iliacus TTP   Education   Learner/Method Patient;No Barriers to Learning   Plan   Home program PTrx on phone   Plan for next session d/c unless new concerns   Comments   Pelvic Health Informed Consent Statement Discussed with patient/guardian reason for referral regarding pelvic health needs and external/internal pelvic floor muscle examination.  Opportunity provided to ask questions and verbal consent for assessment and intervention was given.   Total Session Time   Timed Code Treatment Minutes 34   Total Treatment Time (sum of timed and untimed services) 34       [facility]                                                                                   OUTPATIENT PHYSICAL THERAPY    PLAN OF TREATMENT FOR OUTPATIENT REHABILITATION   Patient's Last Name, First Name, Hien Huynh YOB: 1965   Provider's Name   [facility]   Medical Record No.  [id number]     Onset Date: 01/20/25  Start of Care Date: 03/07/25     Medical Diagnosis:  Obstructive defecation (H)  Pelvic floor dysfunction      PT Treatment Diagnosis:  Obstructive defecation (H)  Pelvic floor dysfunction Plan of Treatment  Frequency/Duration: 1x per month/ 3 months    Certification date from 06/05/25 to 09/02/25         See note for plan of  treatment details and functional goals     Emilia Hill, PT                         I CERTIFY THE NEED FOR THESE SERVICES FURNISHED UNDER        THIS PLAN OF TREATMENT AND WHILE UNDER MY CARE     (Physician attestation of this document indicates review and certification of the therapy plan).              Referring Provider:  Shilpi Smith    Initial Assessment  See Epic Evaluation- Start of Care Date: 03/07/25            PLAN  Continue therapy per current plan of care.    Beginning/End Dates of Progress Note Reporting Period:  03/07/25 to 06/16/2025    Referring Provider:  Shilpi Smith

## 2025-06-21 ENCOUNTER — HEALTH MAINTENANCE LETTER (OUTPATIENT)
Age: 60
End: 2025-06-21